# Patient Record
Sex: FEMALE | Race: WHITE | NOT HISPANIC OR LATINO | Employment: FULL TIME | URBAN - METROPOLITAN AREA
[De-identification: names, ages, dates, MRNs, and addresses within clinical notes are randomized per-mention and may not be internally consistent; named-entity substitution may affect disease eponyms.]

---

## 2017-12-21 PROBLEM — M54.2 PAIN RADIATING TO NECK: Status: ACTIVE | Noted: 2017-12-21

## 2018-01-30 PROBLEM — I10 HYPERTENSION: Status: ACTIVE | Noted: 2018-01-30

## 2018-01-30 PROBLEM — E78.5 HYPERLIPIDEMIA: Status: ACTIVE | Noted: 2018-01-30

## 2020-10-09 ENCOUNTER — CLINICAL SUPPORT (OUTPATIENT)
Dept: AUDIOLOGY | Facility: CLINIC | Age: 50
End: 2020-10-09
Payer: COMMERCIAL

## 2020-10-09 DIAGNOSIS — H93.A1 PULSATILE TINNITUS OF RIGHT EAR: ICD-10-CM

## 2020-10-09 PROCEDURE — 92557 PR COMPREHENSIVE HEARING TEST: ICD-10-PCS | Mod: S$GLB,,, | Performed by: AUDIOLOGIST

## 2020-10-09 PROCEDURE — 99999 PR PBB SHADOW E&M-EST. PATIENT-LVL I: ICD-10-PCS | Mod: PBBFAC,,,

## 2020-10-09 PROCEDURE — 92557 COMPREHENSIVE HEARING TEST: CPT | Mod: S$GLB,,, | Performed by: AUDIOLOGIST

## 2020-10-09 PROCEDURE — 99999 PR PBB SHADOW E&M-EST. PATIENT-LVL I: CPT | Mod: PBBFAC,,,

## 2020-10-09 PROCEDURE — 92567 PR TYMPA2METRY: ICD-10-PCS | Mod: S$GLB,,, | Performed by: AUDIOLOGIST

## 2020-10-09 PROCEDURE — 92567 TYMPANOMETRY: CPT | Mod: S$GLB,,, | Performed by: AUDIOLOGIST

## 2022-07-19 PROBLEM — M54.2 PAIN RADIATING TO NECK: Status: RESOLVED | Noted: 2017-12-21 | Resolved: 2022-07-19

## 2022-08-01 ENCOUNTER — TELEPHONE (OUTPATIENT)
Dept: PAIN MEDICINE | Facility: CLINIC | Age: 52
End: 2022-08-01
Payer: COMMERCIAL

## 2022-08-01 NOTE — TELEPHONE ENCOUNTER
Please have this patient come in for initial evaluation, she was referred by Dr. Lane Oliveira for possible bilateral C5/6 and C6/7 RFA.

## 2022-08-01 NOTE — TELEPHONE ENCOUNTER
Call placed to Pt to schedule appt for evaluation. She was referred by Dr. Lane Oliveira for possible bilateral C5/6 and C6/7 RFA. Appt scheuduled for 8-23-22. Pt verbalized understanding.

## 2022-09-20 ENCOUNTER — OFFICE VISIT (OUTPATIENT)
Dept: PAIN MEDICINE | Facility: CLINIC | Age: 52
End: 2022-09-20
Payer: COMMERCIAL

## 2022-09-20 VITALS
DIASTOLIC BLOOD PRESSURE: 75 MMHG | WEIGHT: 192 LBS | HEART RATE: 79 BPM | HEIGHT: 63 IN | OXYGEN SATURATION: 100 % | BODY MASS INDEX: 34.02 KG/M2 | SYSTOLIC BLOOD PRESSURE: 172 MMHG

## 2022-09-20 DIAGNOSIS — M47.812 CERVICAL SPONDYLOSIS: Primary | ICD-10-CM

## 2022-09-20 DIAGNOSIS — M50.30 DDD (DEGENERATIVE DISC DISEASE), CERVICAL: ICD-10-CM

## 2022-09-20 PROCEDURE — 3077F SYST BP >= 140 MM HG: CPT | Mod: CPTII,S$GLB,, | Performed by: ANESTHESIOLOGY

## 2022-09-20 PROCEDURE — 3078F PR MOST RECENT DIASTOLIC BLOOD PRESSURE < 80 MM HG: ICD-10-PCS | Mod: CPTII,S$GLB,, | Performed by: ANESTHESIOLOGY

## 2022-09-20 PROCEDURE — 3078F DIAST BP <80 MM HG: CPT | Mod: CPTII,S$GLB,, | Performed by: ANESTHESIOLOGY

## 2022-09-20 PROCEDURE — 4010F ACE/ARB THERAPY RXD/TAKEN: CPT | Mod: CPTII,S$GLB,, | Performed by: ANESTHESIOLOGY

## 2022-09-20 PROCEDURE — 4010F PR ACE/ARB THEARPY RXD/TAKEN: ICD-10-PCS | Mod: CPTII,S$GLB,, | Performed by: ANESTHESIOLOGY

## 2022-09-20 PROCEDURE — 1159F PR MEDICATION LIST DOCUMENTED IN MEDICAL RECORD: ICD-10-PCS | Mod: CPTII,S$GLB,, | Performed by: ANESTHESIOLOGY

## 2022-09-20 PROCEDURE — 3008F BODY MASS INDEX DOCD: CPT | Mod: CPTII,S$GLB,, | Performed by: ANESTHESIOLOGY

## 2022-09-20 PROCEDURE — 99204 OFFICE O/P NEW MOD 45 MIN: CPT | Mod: S$GLB,,, | Performed by: ANESTHESIOLOGY

## 2022-09-20 PROCEDURE — 3008F PR BODY MASS INDEX (BMI) DOCUMENTED: ICD-10-PCS | Mod: CPTII,S$GLB,, | Performed by: ANESTHESIOLOGY

## 2022-09-20 PROCEDURE — 99204 PR OFFICE/OUTPT VISIT, NEW, LEVL IV, 45-59 MIN: ICD-10-PCS | Mod: S$GLB,,, | Performed by: ANESTHESIOLOGY

## 2022-09-20 PROCEDURE — 3077F PR MOST RECENT SYSTOLIC BLOOD PRESSURE >= 140 MM HG: ICD-10-PCS | Mod: CPTII,S$GLB,, | Performed by: ANESTHESIOLOGY

## 2022-09-20 PROCEDURE — 99999 PR PBB SHADOW E&M-EST. PATIENT-LVL V: CPT | Mod: PBBFAC,,, | Performed by: ANESTHESIOLOGY

## 2022-09-20 PROCEDURE — 1159F MED LIST DOCD IN RCRD: CPT | Mod: CPTII,S$GLB,, | Performed by: ANESTHESIOLOGY

## 2022-09-20 PROCEDURE — 99999 PR PBB SHADOW E&M-EST. PATIENT-LVL V: ICD-10-PCS | Mod: PBBFAC,,, | Performed by: ANESTHESIOLOGY

## 2022-09-20 RX ORDER — SODIUM CHLORIDE, SODIUM LACTATE, POTASSIUM CHLORIDE, CALCIUM CHLORIDE 600; 310; 30; 20 MG/100ML; MG/100ML; MG/100ML; MG/100ML
INJECTION, SOLUTION INTRAVENOUS CONTINUOUS
Status: CANCELLED | OUTPATIENT
Start: 2022-09-20

## 2022-09-20 NOTE — H&P (VIEW-ONLY)
This note was completed with dictation software and grammatical errors may exist.    Chief Complaint   Patient presents with    Neck Pain        HPI: Lupe Hein is a 52 y.o. year old female patient who has a past medical history of Abdominal bruit, Abnormal EKG, Anxiety, Coronary atherosclerosis, Environmental and seasonal allergies, Family history of cardiovascular disease, Fibroids, GERD (gastroesophageal reflux disease), Heart murmur, History of chicken pox, Hyperlipidemia, Hypertension, Migraine, Overactive bladder, and PVC (premature ventricular contraction). She presents in referral from Dr. Tee Carrington for neck pain.  And states that she has had pain for years, probably greater than 3 years.  Is located the midline neck, does not radiate out into the arms but does report some numbness and tingling in her left thumb and 5th finger at times.  Otherwise she denies any radiation down the arms.  She does have some numbness and tingling in both hands when she sleeps at night.  Otherwise her pain is worse when she 1st wakes up in the morning, feels very stiff, improved slightly throughout the day but then worsens if she is been sitting for a long time.  She does feel that her balance has been off at times but denies any gentry weakness.  She would gone through physical therapy in the past without much relief.  She has tried NSAIDs but has developed an allergy to them.  She has taken hydrocodone at times.  She had an MRI performed in May, 2022 that shows significant degenerative changes at C5/6 and C6/7.  She has a history of neck pain, had seen Dr. Lane Oliveira, spine surgeon who recommended bilateral C5/6 and C6/7 radiofrequency ablation.    She also reports pain all over body, she feels that she has pain in her back, all of her joints when she wakes up in the morning.      Pain intervention history:  No injection    Spine surgeries:  None    Antineuropathics:  NSAIDs:  Physical  "therapy:  Antidepressants:  Zoloft 25  Muscle relaxers:  Opioids:  Hydrocodone 10/325  Antiplatelets/Anticoagulants:  None        ROS:  She reports neck pain, joint stiffness, joint pain.  Balance of review of systems is negative.    No results found for: LABA1C, HGBA1C    Lab Results   Component Value Date    WBC 6.11 2021    HGB 12.5 2021    HCT 39.0 2021    MCV 92 2021     2021             Past Medical History:   Diagnosis Date    Abdominal bruit     Abnormal EKG     Anxiety     Coronary atherosclerosis 2016    Environmental and seasonal allergies     Family history of cardiovascular disease     Fibroids     uterine    GERD (gastroesophageal reflux disease)     Heart murmur     History of chicken pox     Hyperlipidemia     Hypertension     Migraine     Overactive bladder     PVC (premature ventricular contraction)        Past Surgical History:   Procedure Laterality Date    AUGMENTATION OF BREAST  2008    BREAST BIOPSY Left 2015    benign needle biopsy of lymph node. pt still feels this node 2020    BREAST SURGERY  2009     SECTION      X 2    HYSTERECTOMY      LASIK      LYMPH NODE BIOPSY      Dr. Byrne     RHINOPLASTY TIP         Social History     Socioeconomic History    Marital status:    Tobacco Use    Smoking status: Never    Smokeless tobacco: Never   Substance and Sexual Activity    Alcohol use: Yes     Comment: occasionally, increased since COVID    Drug use: Never    Sexual activity: Yes     Partners: Male         Medications/Allergies: See med card    Vitals:    22 0853   BP: (!) 172/75   Pulse: 79   SpO2: 100%   Weight: 87.1 kg (192 lb 0.3 oz)   Height: 5' 3" (1.6 m)   PainSc:   6   PainLoc: Neck     Body mass index is 34.01 kg/m².    Physical exam:  Gen: A and O x3, pleasant, well-groomed  Skin: No rashes or obvious lesions  HEENT: PERRLA, no obvious deformities on ears or in canals.Trachea midline.  CVS: Regular rate and rhythm, " normal palpable pulses.  Resp: Clear to auscultation bilaterally, no wheezes or rales.  Abdomen: Soft, NT/ND.  Musculoskeletal: No antalgic gait.   Coordination   Romberg:  Slight imbalance  Tandem walking coordination: normal    Neuro:  Motor:    Right Left   C4 Shoulder Abduction  5  5   C5 Elbow Flexion    5  5   C6 Wrist Extension  5  5   C7 Elbow Extension   5  5   C8/T1 Hand Intrinsics   5  5   C8 First Dorsal Interosseus  5  5   C8 Abductor Pollicus Brevis  5  5      Left  Right    Triceps DTR 2+ 2+   Biceps DTR 2+ 2+   Brachioradialis DTR 2+ 2+   Patellar DTR 2+ 2+   Achilles DTR 2+ 2+   Ghotra Absent  Absent               Sensory: Intact and symmetrical to light touch and pinprick in C2-T1 dermatomes bilaterally.  Cervical spine: ROM is full in flexion, extension and lateral rotation with increased pain on flexion and also extension and especially oblique extension to the left greater than right side causing pain   Spurling's maneuver causes no neck pain to either side.  Myofascial exam: No Tenderness to palpation across cervical paraspinous region bilaterally.    Imagin22 MRI C-spine:  The cervical vertebral body heights appear to be preserved.  There appears to be minimal 1 mm retrolisthesis of C5 on C6 and C6 on C7.  There is reversal the normal cervical lordosis which could be related to muscular spasm and/or positioning.  Modic 1 endplate degenerative signal changes are seen at the opposing endplates of C6-C7 with likely reactive edema extending into the left greater than right pedicles.  Similar milder findings are noted on the previous MRI from 2019. There is patchy increased T2 signal within the inferior posterior right mastoid air cells compatible with small right mastoid effusion.   The cervical cord demonstrates no definite abnormal T2 signal suggestive of definite myelomalacia or cord edema.   C2-C3 demonstrates no significant posterior disc protrusion, central spinal canal stenosis, or  neural foraminal stenosis.   C3-C4 demonstrates mild broad-based posterior disc osteophyte complex and small central annular fissure.  There is effacement of the ventral thecal sac with mild overall central spinal canal stenosis.  The AP diameter of the central thecal sac measures approximately 8 mm.  No significant neural foraminal stenosis is seen..   C4-C5 demonstrates mild broad-based posterior disc osteophyte complex with effacement of the ventral thecal sac and mild overall central spinal canal stenosis.  Moderate left uncovertebral joint hypertrophy is seen.  Moderate left neural foraminal narrowing is similar to the previous study.   C5-C6 demonstrates moderate to severe disc space narrowing, mild broad-based posterior disc osteophyte complex, moderate bilateral uncovertebral joint hypertrophy, and mild bilateral facet arthrosis with mild overall central spinal canal stenosis.  Moderate bilateral neural foraminal narrowing is again seen.   C6-C7 demonstrates moderate disc space narrowing, moderate to severe broad-based posterior disc osteophyte complex, mild-to-moderate bilateral uncovertebral joint hypertrophy.  There is effacement of the ventral thecal sac with mild to moderate overall central spinal canal stenosis with the AP diameter of the central thecal sac measuring approximately 7-8 mm.  Mild-to-moderate right and mild left neural foraminal narrowing is noted..   C7-T1 demonstrates no significant posterior disc protrusion, central spinal canal stenosis, or neural foraminal stenosis.    Assessment:  Lupe Hein is a 52 y.o. year old female patient who has a past medical history of Abdominal bruit, Abnormal EKG, Anxiety, Coronary atherosclerosis, Environmental and seasonal allergies, Family history of cardiovascular disease, Fibroids, GERD (gastroesophageal reflux disease), Heart murmur, History of chicken pox, Hyperlipidemia, Hypertension, Migraine, Overactive bladder, and PVC (premature  ventricular contraction). She presents in referral from Dr. Tee Carrington for neck pain.    1. Cervical spondylosis        2. DDD (degenerative disc disease), cervical            Plan:  1. We discussed her symptoms, physical exam and cervical MRI.  She has significant degenerative change at C5/6 and C6/7 but also other levels.  We discussed that she could have facet arthropathy causing pain and most likely pain generators are C5/6 and C6/7 so we discussed bilateral medial branch blocks, we will call her the following day to see if she is had relief.  If she does not have relief with this, we could consider trying at C4/5.  Otherwise she could have discogenic pain.  If she does have relief with 2 separate medial branch blocks we will proceed with radiofrequency ablation.      Thank you for referring this interesting patient, and I look forward to continuing to collaborate in her care.

## 2022-10-03 ENCOUNTER — HOSPITAL ENCOUNTER (OUTPATIENT)
Dept: RADIOLOGY | Facility: HOSPITAL | Age: 52
Discharge: HOME | End: 2022-10-03
Attending: ANESTHESIOLOGY | Admitting: ANESTHESIOLOGY
Payer: COMMERCIAL

## 2022-10-03 ENCOUNTER — HOSPITAL ENCOUNTER (OUTPATIENT)
Facility: HOSPITAL | Age: 52
Discharge: HOME | End: 2022-10-03
Attending: ANESTHESIOLOGY | Admitting: ANESTHESIOLOGY
Payer: COMMERCIAL

## 2022-10-03 VITALS
WEIGHT: 190 LBS | BODY MASS INDEX: 33.66 KG/M2 | OXYGEN SATURATION: 99 % | RESPIRATION RATE: 15 BRPM | SYSTOLIC BLOOD PRESSURE: 142 MMHG | HEART RATE: 72 BPM | TEMPERATURE: 97 F | HEIGHT: 63 IN | DIASTOLIC BLOOD PRESSURE: 68 MMHG

## 2022-10-03 DIAGNOSIS — M54.2 NECK PAIN: ICD-10-CM

## 2022-10-03 DIAGNOSIS — M47.812 CERVICAL SPONDYLOSIS: ICD-10-CM

## 2022-10-03 DIAGNOSIS — M50.30 DDD (DEGENERATIVE DISC DISEASE), CERVICAL: ICD-10-CM

## 2022-10-03 PROCEDURE — 64491 PR INJ DX/THER AGNT PARAVERT FACET JOINT,IMG GUIDE,CERV/THORAC, 2ND LEVEL: ICD-10-PCS | Mod: 50,KX,, | Performed by: ANESTHESIOLOGY

## 2022-10-03 PROCEDURE — 64491 INJ PARAVERT F JNT C/T 2 LEV: CPT | Mod: 50,KX,, | Performed by: ANESTHESIOLOGY

## 2022-10-03 PROCEDURE — 25000003 PHARM REV CODE 250: Mod: PO | Performed by: ANESTHESIOLOGY

## 2022-10-03 PROCEDURE — 63600175 PHARM REV CODE 636 W HCPCS: Mod: PO | Performed by: ANESTHESIOLOGY

## 2022-10-03 PROCEDURE — 64490 INJ PARAVERT F JNT C/T 1 LEV: CPT | Mod: 50,KX,, | Performed by: ANESTHESIOLOGY

## 2022-10-03 PROCEDURE — 64490 PR INJ DX/THER AGNT PARAVERT FACET JOINT,IMG GUIDE,CERV/THORAC, 1ST LEVEL: ICD-10-PCS | Mod: 50,KX,, | Performed by: ANESTHESIOLOGY

## 2022-10-03 PROCEDURE — 64490 INJ PARAVERT F JNT C/T 1 LEV: CPT | Mod: 50,PO | Performed by: ANESTHESIOLOGY

## 2022-10-03 PROCEDURE — 99152 MOD SED SAME PHYS/QHP 5/>YRS: CPT | Mod: ,,, | Performed by: ANESTHESIOLOGY

## 2022-10-03 PROCEDURE — 99152 PR MOD CONSCIOUS SEDATION, SAME PHYS, 5+ YRS, FIRST 15 MIN: ICD-10-PCS | Mod: ,,, | Performed by: ANESTHESIOLOGY

## 2022-10-03 PROCEDURE — 76000 FLUOROSCOPY <1 HR PHYS/QHP: CPT | Mod: TC,PO

## 2022-10-03 PROCEDURE — 64491 INJ PARAVERT F JNT C/T 2 LEV: CPT | Mod: 50,PO | Performed by: ANESTHESIOLOGY

## 2022-10-03 RX ORDER — LIDOCAINE HYDROCHLORIDE 10 MG/ML
INJECTION, SOLUTION EPIDURAL; INFILTRATION; INTRACAUDAL; PERINEURAL
Status: DISCONTINUED | OUTPATIENT
Start: 2022-10-03 | End: 2022-10-03 | Stop reason: HOSPADM

## 2022-10-03 RX ORDER — BUPIVACAINE HYDROCHLORIDE 2.5 MG/ML
INJECTION, SOLUTION EPIDURAL; INFILTRATION; INTRACAUDAL
Status: DISCONTINUED | OUTPATIENT
Start: 2022-10-03 | End: 2022-10-03 | Stop reason: HOSPADM

## 2022-10-03 RX ORDER — MIDAZOLAM HYDROCHLORIDE 2 MG/2ML
INJECTION, SOLUTION INTRAMUSCULAR; INTRAVENOUS
Status: DISCONTINUED | OUTPATIENT
Start: 2022-10-03 | End: 2022-10-03 | Stop reason: HOSPADM

## 2022-10-03 RX ORDER — SODIUM CHLORIDE, SODIUM LACTATE, POTASSIUM CHLORIDE, CALCIUM CHLORIDE 600; 310; 30; 20 MG/100ML; MG/100ML; MG/100ML; MG/100ML
INJECTION, SOLUTION INTRAVENOUS CONTINUOUS
Status: DISCONTINUED | OUTPATIENT
Start: 2022-10-03 | End: 2022-10-03 | Stop reason: HOSPADM

## 2022-10-03 RX ADMIN — SODIUM CHLORIDE, SODIUM LACTATE, POTASSIUM CHLORIDE, AND CALCIUM CHLORIDE: .6; .31; .03; .02 INJECTION, SOLUTION INTRAVENOUS at 01:10

## 2022-10-03 NOTE — DISCHARGE INSTRUCTIONS
PAIN MANAGEMENT    HOME CARE INSTRUCTIONS   Do not use heat (such as a heating pad) for 24 hours.  You may apply an ice pack to the injection site for 20 minutes at a time for the first 24 hours for soreness/discomfort at injection site   Keep site clean and dry for 24 hours. If bandaid is present, remove when desired.   Do not drive until tomorrow.  Take care when walking after a lumbar injection.   Resume home medication as prescribed today.  Resume Aspirin, Plavix, or Coumadin the day after the procedure unless other wise instructed.    BLOCKS  Resume regular activities today.  Pain office will call in next 2 days.      CALL PHYSICIAN FOR:  Severe increase in your usual pain or the appearance of new pain.  Prolonged or increasing weakness or numbness in the legs or arms.  Fever greater than 100 degrees F.  Drainage, redness, active bleeding, or increased swelling at the injection site.  Headache that increases when your head is upright and decreases when you lie flat.    FOR EMERGENCIES:   Go directly to the emergency department for any shortness of breath, chest pain, or problems breathing.

## 2022-10-03 NOTE — OP NOTE
PROCEDURE DATE: 10/3/2022    PROCEDURE:  Diagnostic Cervical medial branch block of the bilateral C5,6,7 medial branch nerves on the bilateral-side utilizing fluoroscopy    DIAGNOSIS:  Cervical spondylosis    PHYSICIAN: Julius Doshi MD    MEDICATIONS INJECTED:  0.25% bupivicaine, 0.5ml at each level.    LOCAL ANESTHETIC USED:   1% lidocaine, 1ml at each level.    SEDATION MEDICATIONS: 4mg versed    ESTIMATED BLOOD LOSS:  none    COMPLICATIONS:  none    TECHNIQUE : A time-out was taken to identify patient and procedure side prior to starting the procedure.  The patient was positioned prone with the site of interest side up. The patient was prepped and draped in the usual sterile fashion using ChloraPrep and sterile towels.  The level was determined under fluoroscopic guidance using a slightly posteriorly oblique view.   Local anesthetic was infiltrated superficially at the skin level.  Then, a 25 gauge 3.5 inch needle was inserted to the anatomic location of the midsection of the lateral masses of the right and then left C5,6,7. A cross table view was then taken to ensure that needles did not cross into neural foramina.  contrast dye was then injected to assess appropriate spread and that there was no vascular uptake. The above noted medication was then injected. The patient tolerated the procedure well.     The patient was monitored after the procedure. The patient will be contacted tomorrow to determine the extent of relief. The patient was given post procedure and discharge instructions to follow at home. The patient was discharged in a stable condition    Event Time In   In Facility 1256   In Pre-Procedure 1319   Physician Available    Anesthesia Available    Pre-Op: Bedside Procedure Start    Pre-Op: Bedside Procedure Stop    Pre-Procedure Complete 1341   Out of Pre-Procedure    Anesthesia Start    Anesthesia Start Data Collection    Setup Start    Setup Complete    In Room 1423   Prep Start    Procedure  Prep Complete    Procedure Start 1431   Procedure Closing    Emergence    Procedure Finish 1443   Sedation Start 1422   Scope In    Extent Reached    Scope Out    Sedation End 1443   Out of Room 1446   Cleanup Start    Cleanup Complete    Cosmetic Start    Cosmetic Stop    Pain Mgmt In Room    Pain Mgmt Out Room    In Recovery    Anesthesia Finish    Bedside Procedure Start    Bedside Procedure Stop    Recovery Care Complete    Out of Recovery    To Phase II    In Phase II    Pain Mgmt Recovery Start    Pain Mgmt Recovery Stop    Obs Rec Start    Obs Rec Stop    Phase II Care Complete    Out of Phase II    Procedural Care Complete    Discharge    Pain Follow Up Needed    Pain Follow Up Complete      Moderate sedation was achieved with midazolam 4 mg .  Continuous monitoring of EKG, blood pressure and pulse oximetry was provided by a registered nurse during the entire course of the procedure under my supervision and recorded in the patient's medical record.   Total time for sedation was 21 minutes.

## 2022-10-03 NOTE — DISCHARGE SUMMARY
[facility]  Discharge Note  Short Stay    Procedure(s) (LRB):  Block-nerve-medial branch-cervical C5/6, C6/7 (Bilateral)      OUTCOME: Patient tolerated treatment/procedure well without complication and is now ready for discharge.    DISPOSITION: Home or Self Care    FINAL DIAGNOSIS:  Cervical spondylosis    FOLLOWUP: In clinic    DISCHARGE INSTRUCTIONS:    Discharge Procedure Orders   Diet Adult Regular     No dressing needed     Notify your health care provider if you experience any of the following:  temperature >100.4     Activity as tolerated

## 2022-10-04 ENCOUNTER — TELEPHONE (OUTPATIENT)
Dept: PAIN MEDICINE | Facility: CLINIC | Age: 52
End: 2022-10-04
Payer: COMMERCIAL

## 2022-10-04 DIAGNOSIS — M47.812 CERVICAL SPONDYLOSIS: Primary | ICD-10-CM

## 2022-10-04 RX ORDER — SODIUM CHLORIDE, SODIUM LACTATE, POTASSIUM CHLORIDE, CALCIUM CHLORIDE 600; 310; 30; 20 MG/100ML; MG/100ML; MG/100ML; MG/100ML
INJECTION, SOLUTION INTRAVENOUS CONTINUOUS
Status: CANCELLED | OUTPATIENT
Start: 2022-10-13

## 2022-10-04 NOTE — TELEPHONE ENCOUNTER
We gave her 4 mg of Versed which is quite a lot.  Please let her know that she is going to hear the conversations that we have, this does not put her to sleep.  She is going to feel sensations because she is not completely under anesthesia.  We will try to keep her more comfortable, this involves putting in more local anesthetic but for safety reasons we do not put people completely under anesthesia.

## 2022-10-04 NOTE — TELEPHONE ENCOUNTER
Call placed to Pt to review results from Cervical MBB, C5/6, C6/7 performed by Dr. Doshi to ask:    1. What percentage of pain relief did you receive following the block, from 0-100%? What was pain score from 0-10?    2. How many hours did pain relief last following the block?      3. During this time please describe in detail the activities you were able to do?      1- 90-95% Pain score 1.  2- 8hrs  3- walking, fixing dinner    2nd block scheduled for 10-13-22. Pt would like to possibly be the first one of the day.     Pt states that she would like to increase the amount of sedation she received. She was able to feel most of it and could hear the conversations during the procedure. Request forwarded to Dr. Doshi for review.

## 2022-10-05 NOTE — TELEPHONE ENCOUNTER
Call placed to Pt to advise per Dr. Doshi that the Versed does not put her to sleep.      She is going to feel sensations because she is not completely under anesthesia.      We will try to keep her more comfortable, this involves putting in more local anesthetic but for safety reasons we do not put people completely under anesthesia.    No answer. V/m left.

## 2022-10-06 ENCOUNTER — TELEPHONE (OUTPATIENT)
Dept: GASTROENTEROLOGY | Facility: CLINIC | Age: 52
End: 2022-10-06
Payer: COMMERCIAL

## 2022-10-06 ENCOUNTER — TELEPHONE (OUTPATIENT)
Dept: PAIN MEDICINE | Facility: CLINIC | Age: 52
End: 2022-10-06
Payer: COMMERCIAL

## 2022-10-06 RX ORDER — TRAMADOL HYDROCHLORIDE 50 MG/1
50 TABLET ORAL EVERY 8 HOURS PRN
Qty: 20 TABLET | Refills: 0 | Status: SHIPPED | OUTPATIENT
Start: 2022-10-06 | End: 2022-11-23

## 2022-10-06 NOTE — TELEPHONE ENCOUNTER
----- Message from Nayeli Marie sent at 10/5/2022  1:58 PM CDT -----  Regarding: Questions and concerns  Contact: [phone]  Patient Lupe is calling. Patient had a procedure done on 10/3 and patient is having more pain than usual. Cervical nerve block. Please call patient at [phone]     Thank You

## 2022-10-06 NOTE — TELEPHONE ENCOUNTER
Returned call to the patient, patient requested to set up colonoscopy, colonoscopy scheduled with the patient, patient verbalized understanding of this.

## 2022-10-06 NOTE — TELEPHONE ENCOUNTER
Cervical nerve block done on 10/3 and patient is having more pain in her neck on the left side. Pain score is a 8. Request for pain meds to help until next procedure on  10/13/22. Message forwarded to Dr. Doshi for review.

## 2022-10-06 NOTE — TELEPHONE ENCOUNTER
----- Message from Christy Padilla sent at 10/6/2022  3:08 PM CDT -----  Contact: self  Type: Needs Medical Advice    Who Called:  patient  Symptoms (please be specific):  c-scope consultation     Best Call Back Number: [phone]   Additional Information: The pt stated that she would like to jemima a c-scope consultation before she jemima a c-scope. The pt would like to jemima the procs with Dr. Saldana.  Please call pt back to Count includes the [facility] an appt. Thanks.

## 2022-10-13 ENCOUNTER — TELEPHONE (OUTPATIENT)
Dept: GASTROENTEROLOGY | Facility: CLINIC | Age: 52
End: 2022-10-13
Payer: COMMERCIAL

## 2022-10-13 ENCOUNTER — HOSPITAL ENCOUNTER (OUTPATIENT)
Dept: RADIOLOGY | Facility: HOSPITAL | Age: 52
Discharge: HOME | End: 2022-10-13
Attending: ANESTHESIOLOGY | Admitting: ANESTHESIOLOGY
Payer: COMMERCIAL

## 2022-10-13 ENCOUNTER — HOSPITAL ENCOUNTER (OUTPATIENT)
Facility: HOSPITAL | Age: 52
Discharge: HOME | End: 2022-10-13
Attending: ANESTHESIOLOGY | Admitting: ANESTHESIOLOGY
Payer: COMMERCIAL

## 2022-10-13 ENCOUNTER — PATIENT MESSAGE (OUTPATIENT)
Dept: GASTROENTEROLOGY | Facility: CLINIC | Age: 52
End: 2022-10-13
Payer: COMMERCIAL

## 2022-10-13 VITALS
DIASTOLIC BLOOD PRESSURE: 74 MMHG | BODY MASS INDEX: 33.84 KG/M2 | SYSTOLIC BLOOD PRESSURE: 162 MMHG | HEART RATE: 72 BPM | WEIGHT: 191 LBS | OXYGEN SATURATION: 98 % | RESPIRATION RATE: 16 BRPM | TEMPERATURE: 98 F | HEIGHT: 63 IN

## 2022-10-13 DIAGNOSIS — M54.2 NECK PAIN: ICD-10-CM

## 2022-10-13 DIAGNOSIS — M47.812 CERVICAL SPONDYLOSIS: ICD-10-CM

## 2022-10-13 PROCEDURE — 64490 INJ PARAVERT F JNT C/T 1 LEV: CPT | Mod: 50,PO | Performed by: ANESTHESIOLOGY

## 2022-10-13 PROCEDURE — 64491 PR INJ DX/THER AGNT PARAVERT FACET JOINT,IMG GUIDE,CERV/THORAC, 2ND LEVEL: ICD-10-PCS | Mod: 50,KX,, | Performed by: ANESTHESIOLOGY

## 2022-10-13 PROCEDURE — 64490 PR INJ DX/THER AGNT PARAVERT FACET JOINT,IMG GUIDE,CERV/THORAC, 1ST LEVEL: ICD-10-PCS | Mod: 50,KX,, | Performed by: ANESTHESIOLOGY

## 2022-10-13 PROCEDURE — 63600175 PHARM REV CODE 636 W HCPCS: Mod: PO | Performed by: ANESTHESIOLOGY

## 2022-10-13 PROCEDURE — 64491 INJ PARAVERT F JNT C/T 2 LEV: CPT | Mod: 50,KX,, | Performed by: ANESTHESIOLOGY

## 2022-10-13 PROCEDURE — 64490 INJ PARAVERT F JNT C/T 1 LEV: CPT | Mod: 50,KX,, | Performed by: ANESTHESIOLOGY

## 2022-10-13 PROCEDURE — 64491 INJ PARAVERT F JNT C/T 2 LEV: CPT | Mod: 50,PO | Performed by: ANESTHESIOLOGY

## 2022-10-13 PROCEDURE — 99152 PR MOD CONSCIOUS SEDATION, SAME PHYS, 5+ YRS, FIRST 15 MIN: ICD-10-PCS | Mod: ,,, | Performed by: ANESTHESIOLOGY

## 2022-10-13 PROCEDURE — 99152 MOD SED SAME PHYS/QHP 5/>YRS: CPT | Mod: ,,, | Performed by: ANESTHESIOLOGY

## 2022-10-13 PROCEDURE — 25000003 PHARM REV CODE 250: Mod: PO | Performed by: ANESTHESIOLOGY

## 2022-10-13 PROCEDURE — 76000 FLUOROSCOPY <1 HR PHYS/QHP: CPT | Mod: TC,PO

## 2022-10-13 RX ORDER — BUPIVACAINE HYDROCHLORIDE 2.5 MG/ML
INJECTION, SOLUTION EPIDURAL; INFILTRATION; INTRACAUDAL
Status: DISCONTINUED | OUTPATIENT
Start: 2022-10-13 | End: 2022-10-13 | Stop reason: HOSPADM

## 2022-10-13 RX ORDER — SODIUM CHLORIDE, SODIUM LACTATE, POTASSIUM CHLORIDE, CALCIUM CHLORIDE 600; 310; 30; 20 MG/100ML; MG/100ML; MG/100ML; MG/100ML
INJECTION, SOLUTION INTRAVENOUS CONTINUOUS
Status: DISCONTINUED | OUTPATIENT
Start: 2022-10-13 | End: 2022-10-13 | Stop reason: HOSPADM

## 2022-10-13 RX ORDER — FENTANYL CITRATE 50 UG/ML
INJECTION, SOLUTION INTRAMUSCULAR; INTRAVENOUS
Status: DISCONTINUED | OUTPATIENT
Start: 2022-10-13 | End: 2022-10-13 | Stop reason: HOSPADM

## 2022-10-13 RX ORDER — MIDAZOLAM HYDROCHLORIDE 2 MG/2ML
INJECTION, SOLUTION INTRAMUSCULAR; INTRAVENOUS
Status: DISCONTINUED | OUTPATIENT
Start: 2022-10-13 | End: 2022-10-13 | Stop reason: HOSPADM

## 2022-10-13 RX ADMIN — SODIUM CHLORIDE, SODIUM LACTATE, POTASSIUM CHLORIDE, AND CALCIUM CHLORIDE: .6; .31; .03; .02 INJECTION, SOLUTION INTRAVENOUS at 04:10

## 2022-10-13 NOTE — OP NOTE
PROCEDURE DATE: 10/13/2022    PROCEDURE:  Diagnostic Cervical medial branch block of the C5,6,7 medial branch nerves on the bilateral-side utilizing fluoroscopy    DIAGNOSIS:  Cervical spondylosis    PHYSICIAN: Julius Doshi MD    MEDICATIONS INJECTED:  0.25% bupivicaine, 0.5ml at each level.    LOCAL ANESTHETIC USED:   1% lidocaine, 1ml at each level.    SEDATION MEDICATIONS: 4mg versed, 25mcg fentanyl    ESTIMATED BLOOD LOSS:  none    COMPLICATIONS:  none    TECHNIQUE : A time-out was taken to identify patient and procedure side prior to starting the procedure.  The patient was positioned prone with the site of interest side up. The patient was prepped and draped in the usual sterile fashion using ChloraPrep and sterile towels.  The level was determined under fluoroscopic guidance using a slightly posteriorly oblique view.   Local anesthetic was infiltrated superficially at the skin level.  Then, a 25 gauge 3.5 inch needle was inserted to the anatomic location of the midsection of the lateral masses of C5,6,7 bilaterally. A cross table view was then taken to ensure that needles did not cross into neural foramina.  The above noted medication was then injected. The patient tolerated the procedure well.     The patient was monitored after the procedure. The patient will be contacted tomorrow to determine the extent of relief. The patient was given post procedure and discharge instructions to follow at home. The patient was discharged in a stable condition.    Event Time In   In Facility 1507   In Pre-Procedure 1523   Physician Available    Anesthesia Available    Pre-Op: Bedside Procedure Start    Pre-Op: Bedside Procedure Stop    Pre-Procedure Complete 1614   Out of Pre-Procedure    Anesthesia Start    Anesthesia Start Data Collection    Setup Start    Setup Complete    In Room 1635   Prep Start    Procedure Prep Complete    Procedure Start 1643   Procedure Closing    Emergence    Procedure Finish 1653   Sedation  Start 1634   Scope In    Extent Reached    Scope Out    Sedation End 1653   Out of Room 1655   Cleanup Start    Cleanup Complete    Cosmetic Start    Cosmetic Stop    Pain Mgmt In Room    Pain Mgmt Out Room    In Recovery    Anesthesia Finish    Bedside Procedure Start    Bedside Procedure Stop    Recovery Care Complete    Out of Recovery    To Phase II    In Phase II    Pain Mgmt Recovery Start 1656   Pain Mgmt Recovery Stop    Obs Rec Start    Obs Rec Stop    Phase II Care Complete    Out of Phase II    Procedural Care Complete    Discharge    Pain Follow Up Needed    Pain Follow Up Complete      Moderate sedation was achieved with midazolam 4 mg and fentanyl 25 mcg.  Continuous monitoring of EKG, blood pressure and pulse oximetry was provided by a registered nurse during the entire course of the procedure under my supervision and recorded in the patient's medical record.   Total time for sedation was 19 minutes.

## 2022-10-13 NOTE — TELEPHONE ENCOUNTER
----- Message from Magda Dong sent at 10/13/2022 11:32 AM CDT -----  Contact: pt  Type: Needs Colonoscopy cancelled    Who Called: pt   Best Call Back Number: [phone]    Inquiry/Question: pt would like to cancel the colonoscopy scheduled for 01/24/2022         Thank you~

## 2022-10-13 NOTE — DISCHARGE SUMMARY
[facility]  Discharge Note  Short Stay    Procedure(s) (LRB):  Block-nerve-medial branch-cervical, C5/6, C6/7 (Bilateral)      OUTCOME: Patient tolerated treatment/procedure well without complication and is now ready for discharge.    DISPOSITION: Home or Self Care    FINAL DIAGNOSIS:  Cervical spondylosis    FOLLOWUP: In clinic    DISCHARGE INSTRUCTIONS:    Discharge Procedure Orders   Diet Adult Regular     No dressing needed     Notify your health care provider if you experience any of the following:  temperature >100.4     Activity as tolerated

## 2022-10-17 ENCOUNTER — PATIENT MESSAGE (OUTPATIENT)
Dept: GASTROENTEROLOGY | Facility: CLINIC | Age: 52
End: 2022-10-17
Payer: COMMERCIAL

## 2022-10-18 ENCOUNTER — TELEPHONE (OUTPATIENT)
Dept: PAIN MEDICINE | Facility: CLINIC | Age: 52
End: 2022-10-18
Payer: COMMERCIAL

## 2022-10-18 NOTE — TELEPHONE ENCOUNTER
Call placed to Pt to review the results of Cervical MBB, C5/6, C6/7, ZAHIDA performed by Dr. Doshi to ask:    1. What percentage of pain relief did you receive following the block, from 0-100%? What was pain score from 0-10?    2. How many hours did pain relief last following the block?      3. During this time please describe in detail the activities you were able to do?    No answer. V/M left.

## 2022-10-21 ENCOUNTER — TELEPHONE (OUTPATIENT)
Dept: PAIN MEDICINE | Facility: CLINIC | Age: 52
End: 2022-10-21
Payer: COMMERCIAL

## 2022-10-21 DIAGNOSIS — M47.812 CERVICAL SPONDYLOSIS: Primary | ICD-10-CM

## 2022-10-21 RX ORDER — SODIUM CHLORIDE, SODIUM LACTATE, POTASSIUM CHLORIDE, CALCIUM CHLORIDE 600; 310; 30; 20 MG/100ML; MG/100ML; MG/100ML; MG/100ML
INJECTION, SOLUTION INTRAVENOUS CONTINUOUS
Status: CANCELLED | OUTPATIENT
Start: 2022-11-04

## 2022-10-21 NOTE — TELEPHONE ENCOUNTER
Cervical RFA, C5/6, C6/7, ZAHIDA  scheduled for 11-4-22. F/U scheduled for 12-5-22. Pt will need to hold ASA x 7 days for the procedure. Clearance request sent to Dr. Little.

## 2022-10-21 NOTE — TELEPHONE ENCOUNTER
Second call placed to Pt to review the results of Cervical MBB, C5/6, C6/7, ZAHIDA performed by Dr. Doshi to ask:     1. What percentage of pain relief did you receive following the block, from 0-100%? What was pain score from 0-10?     2. How many hours did pain relief last following the block?       3. During this time please describe in detail the activities you were able to do?     1- % Pain score 2.   2- 6hrs  3- House work, bending.     RFA scheduled for 11-4-22. F/U scheduled for 12-5-22.

## 2022-10-25 ENCOUNTER — PATIENT MESSAGE (OUTPATIENT)
Dept: GASTROENTEROLOGY | Facility: CLINIC | Age: 52
End: 2022-10-25
Payer: COMMERCIAL

## 2022-10-25 NOTE — TELEPHONE ENCOUNTER
Call placed to Pt to advise per Dr. Little, it is ok to hold ASA x 7 days for the procedure on 11-4-22 with Dr. Doshi. No answer. V/m left.

## 2022-11-03 ENCOUNTER — TELEPHONE (OUTPATIENT)
Dept: PAIN MEDICINE | Facility: CLINIC | Age: 52
End: 2022-11-03
Payer: COMMERCIAL

## 2022-11-03 ENCOUNTER — TELEPHONE (OUTPATIENT)
Dept: SURGERY | Facility: HOSPITAL | Age: 52
End: 2022-11-03
Payer: COMMERCIAL

## 2022-11-03 NOTE — TELEPHONE ENCOUNTER
Call returned to Pt to reschedule RFA with Dr. Doshi. Procedure moved to 11-18-22. Pt verbalized understanding.

## 2022-11-03 NOTE — TELEPHONE ENCOUNTER
----- Message from Tejas Swartz sent at 11/3/2022  9:06 AM CDT -----  Name Of Caller:Lupe            Provider Name:Julius Doshi            Does patient feel the need to be seen today?No            Relationship to the Pt?:Patient            Contact Preference?:[phone]            What is the nature of the call?: Patient would like to reschedule an procedure tomorrow. Patient would like to receive a phone call to reschedule

## 2022-11-03 NOTE — TELEPHONE ENCOUNTER
Good morning,     Ms. Hein would like to reschedule her cervical KYA with Dr. Doshi - tomorrow no longer works for her due to work conflicts.     Please call her to reschedule.     Thank you!

## 2022-11-16 RX ORDER — PANTOPRAZOLE SODIUM 40 MG/1
40 TABLET, DELAYED RELEASE ORAL DAILY
COMMUNITY

## 2022-11-18 ENCOUNTER — HOSPITAL ENCOUNTER (OUTPATIENT)
Facility: HOSPITAL | Age: 52
Discharge: HOME | End: 2022-11-18
Attending: ANESTHESIOLOGY | Admitting: ANESTHESIOLOGY
Payer: COMMERCIAL

## 2022-11-18 ENCOUNTER — PATIENT MESSAGE (OUTPATIENT)
Dept: SURGERY | Facility: HOSPITAL | Age: 52
End: 2022-11-18
Payer: COMMERCIAL

## 2022-11-18 ENCOUNTER — HOSPITAL ENCOUNTER (OUTPATIENT)
Dept: RADIOLOGY | Facility: HOSPITAL | Age: 52
Discharge: HOME | End: 2022-11-18
Attending: ANESTHESIOLOGY | Admitting: ANESTHESIOLOGY
Payer: COMMERCIAL

## 2022-11-18 VITALS
SYSTOLIC BLOOD PRESSURE: 171 MMHG | DIASTOLIC BLOOD PRESSURE: 82 MMHG | HEART RATE: 64 BPM | BODY MASS INDEX: 32.96 KG/M2 | TEMPERATURE: 99 F | HEIGHT: 63 IN | WEIGHT: 186 LBS | OXYGEN SATURATION: 99 % | RESPIRATION RATE: 18 BRPM

## 2022-11-18 DIAGNOSIS — M47.812 CERVICAL SPONDYLOSIS: ICD-10-CM

## 2022-11-18 DIAGNOSIS — M54.2 NECK PAIN: ICD-10-CM

## 2022-11-18 PROCEDURE — 64634 PR DESTROY C/TH FACET JNT ADDL: ICD-10-PCS | Mod: 50,,, | Performed by: ANESTHESIOLOGY

## 2022-11-18 PROCEDURE — A4216 STERILE WATER/SALINE, 10 ML: HCPCS | Mod: PO | Performed by: ANESTHESIOLOGY

## 2022-11-18 PROCEDURE — 25000003 PHARM REV CODE 250: Mod: PO | Performed by: ANESTHESIOLOGY

## 2022-11-18 PROCEDURE — 64633 DESTROY CERV/THOR FACET JNT: CPT | Mod: 50,,, | Performed by: ANESTHESIOLOGY

## 2022-11-18 PROCEDURE — 64635 DESTROY LUMB/SAC FACET JNT: CPT | Mod: 50,PO | Performed by: ANESTHESIOLOGY

## 2022-11-18 PROCEDURE — 63600175 PHARM REV CODE 636 W HCPCS: Mod: PO | Performed by: ANESTHESIOLOGY

## 2022-11-18 PROCEDURE — 64634 DESTROY C/TH FACET JNT ADDL: CPT | Mod: 50,,, | Performed by: ANESTHESIOLOGY

## 2022-11-18 PROCEDURE — 99152 PR MOD CONSCIOUS SEDATION, SAME PHYS, 5+ YRS, FIRST 15 MIN: ICD-10-PCS | Mod: ,,, | Performed by: ANESTHESIOLOGY

## 2022-11-18 PROCEDURE — 64634 DESTROY C/TH FACET JNT ADDL: CPT | Mod: 50,PO | Performed by: ANESTHESIOLOGY

## 2022-11-18 PROCEDURE — 64633 PR DESTROY CERV/THOR FACET JNT: ICD-10-PCS | Mod: 50,,, | Performed by: ANESTHESIOLOGY

## 2022-11-18 PROCEDURE — 76000 FLUOROSCOPY <1 HR PHYS/QHP: CPT | Mod: TC,PO

## 2022-11-18 PROCEDURE — 64633 DESTROY CERV/THOR FACET JNT: CPT | Mod: 50,PO | Performed by: ANESTHESIOLOGY

## 2022-11-18 PROCEDURE — 99152 MOD SED SAME PHYS/QHP 5/>YRS: CPT | Mod: ,,, | Performed by: ANESTHESIOLOGY

## 2022-11-18 PROCEDURE — 64636 DESTROY L/S FACET JNT ADDL: CPT | Mod: 50,PO | Performed by: ANESTHESIOLOGY

## 2022-11-18 RX ORDER — MIDAZOLAM HYDROCHLORIDE 2 MG/2ML
INJECTION, SOLUTION INTRAMUSCULAR; INTRAVENOUS
Status: DISCONTINUED | OUTPATIENT
Start: 2022-11-18 | End: 2022-11-18 | Stop reason: HOSPADM

## 2022-11-18 RX ORDER — HYDROCODONE BITARTRATE AND ACETAMINOPHEN 5; 325 MG/1; MG/1
1 TABLET ORAL ONCE
Status: COMPLETED | OUTPATIENT
Start: 2022-11-18 | End: 2022-11-18

## 2022-11-18 RX ORDER — FENTANYL CITRATE 50 UG/ML
INJECTION, SOLUTION INTRAMUSCULAR; INTRAVENOUS
Status: DISCONTINUED | OUTPATIENT
Start: 2022-11-18 | End: 2022-11-18 | Stop reason: HOSPADM

## 2022-11-18 RX ORDER — LIDOCAINE HYDROCHLORIDE 20 MG/ML
INJECTION, SOLUTION EPIDURAL; INFILTRATION; INTRACAUDAL; PERINEURAL
Status: DISCONTINUED | OUTPATIENT
Start: 2022-11-18 | End: 2022-11-18 | Stop reason: HOSPADM

## 2022-11-18 RX ORDER — METHYLPREDNISOLONE ACETATE 40 MG/ML
INJECTION, SUSPENSION INTRA-ARTICULAR; INTRALESIONAL; INTRAMUSCULAR; SOFT TISSUE
Status: DISCONTINUED | OUTPATIENT
Start: 2022-11-18 | End: 2022-11-18 | Stop reason: HOSPADM

## 2022-11-18 RX ORDER — SODIUM CHLORIDE, SODIUM LACTATE, POTASSIUM CHLORIDE, CALCIUM CHLORIDE 600; 310; 30; 20 MG/100ML; MG/100ML; MG/100ML; MG/100ML
INJECTION, SOLUTION INTRAVENOUS CONTINUOUS
Status: DISCONTINUED | OUTPATIENT
Start: 2022-11-18 | End: 2022-11-18 | Stop reason: HOSPADM

## 2022-11-18 RX ORDER — HYDROCODONE BITARTRATE AND ACETAMINOPHEN 5; 325 MG/1; MG/1
1 TABLET ORAL EVERY 6 HOURS PRN
Qty: 20 TABLET | Refills: 0 | Status: SHIPPED | OUTPATIENT
Start: 2022-11-18 | End: 2023-03-23

## 2022-11-18 RX ORDER — LIDOCAINE HYDROCHLORIDE 10 MG/ML
INJECTION, SOLUTION EPIDURAL; INFILTRATION; INTRACAUDAL; PERINEURAL
Status: DISCONTINUED | OUTPATIENT
Start: 2022-11-18 | End: 2022-11-18 | Stop reason: HOSPADM

## 2022-11-18 RX ORDER — SODIUM CHLORIDE 9 MG/ML
INJECTION, SOLUTION INTRAMUSCULAR; INTRAVENOUS; SUBCUTANEOUS
Status: DISCONTINUED | OUTPATIENT
Start: 2022-11-18 | End: 2022-11-18 | Stop reason: HOSPADM

## 2022-11-18 RX ADMIN — SODIUM CHLORIDE, SODIUM LACTATE, POTASSIUM CHLORIDE, AND CALCIUM CHLORIDE: .6; .31; .03; .02 INJECTION, SOLUTION INTRAVENOUS at 08:11

## 2022-11-18 RX ADMIN — HYDROCODONE BITARTRATE AND ACETAMINOPHEN 1 TABLET: 5; 325 TABLET ORAL at 09:11

## 2022-11-18 NOTE — PLAN OF CARE
Pt VSS & all questions/concerns answered or directed to appropriate staff. Pt denies recent fever or illness. Belongings placed under stretcher, purse and glasses left with mother @ [facility]. Procedure consents signed by pt or proxy. Pt states ready for procedure.

## 2022-11-18 NOTE — OP NOTE
PROCEDURE DATE: 11/18/2022    PROCEDURE:  Radiofrequency ablation of the bilateral C5/6 and C6/7 medial branch nerves on the bilateral-side under fluoroscopy    DIAGNOSIS:  Cervical spondylosis    Post Op Diagnosis: Same    PHYSICIAN: Julius Doshi MD    MEDICATIONS INJECTED:  From a mixture of 4ml of 2% lidocaine and 40mg of methylprednisone, 1ml of this solution was injected at each level.    LOCAL ANESTHETIC USED:   1ml of lidocaine 1% at each level    SEDATION MEDICATIONS: 4mg versed, 75mcg fentanyl    ESTIMATED BLOOD LOSS:  none    COMPLICATIONS:  none    TECHNIQUE:   A time-out taken to identify patient and procedure side prior to starting the procedure.  The patient was positioned prone with the site of interest side up. The patient was prepped and draped in the usual sterile fashion using ChloraPrep and sterile towels.  The levels were determined under fluoroscopic guidance using a slightly posteriorly oblique view.   Local anesthetic was infiltrated superficially at the skin.  Then a 100 mm 22g bent tip [place] RF needle was inserted to the anatomic location of the midsection of the lateral masses of C5,6,7 on both side. Impedance was less than 800 ohms at each level. Motor stimulation up to 2 volts confirmed there was no nerve root involvement at each level. Medication was then injected slowly.  Ablation was done per level utilizing Betsy radiofrequency generator at 80°C for 90 seconds. The patient tolerated the procedure well.     The patient was monitored after the procedure.  Patient was given post procedure and discharge instructions to follow at home.  The patient was discharged in a stable condition    Event Time In   In Facility 0741   In Pre-Procedure 0757   Physician Available    Anesthesia Available    Pre-Op: Bedside Procedure Start    Pre-Op: Bedside Procedure Stop    Pre-Procedure Complete 0843   Out of Pre-Procedure    Anesthesia Start    Anesthesia Start Data Collection    Setup Start     Setup Complete    In Room 0836   Prep Start    Procedure Prep Complete    Procedure Start 0841   Procedure Closing    Emergence    Procedure Finish 0911   Sedation Start 0835   Scope In    Extent Reached    Scope Out    Sedation End 0911   Out of Room    Cleanup Start    Cleanup Complete    Cosmetic Start    Cosmetic Stop    Pain Mgmt In Room    Pain Mgmt Out Room    In Recovery    Anesthesia Finish    Bedside Procedure Start    Bedside Procedure Stop    Recovery Care Complete    Out of Recovery    To Phase II    In Phase II    Pain Mgmt Recovery Start    Pain Mgmt Recovery Stop    Obs Rec Start    Obs Rec Stop    Phase II Care Complete    Out of Phase II    Procedural Care Complete    Discharge    Pain Follow Up Needed    Pain Follow Up Complete      Moderate sedation was achieved with midazolam 4 mg and fentanyl 75 mcg.  Continuous monitoring of EKG, blood pressure and pulse oximetry was provided by a registered nurse during the entire course of the procedure under my supervision and recorded in the patient's medical record.   Total time for sedation was 36 minutes.

## 2022-11-18 NOTE — DISCHARGE SUMMARY
[facility]  Discharge Note  Short Stay    Procedure(s) (LRB):  Radiofrequency Ablation, Cervical, C5/6, C6/7,, ZAHIDA. (Bilateral)      OUTCOME: Patient tolerated treatment/procedure well without complication and is now ready for discharge.    DISPOSITION: Home or Self Care    FINAL DIAGNOSIS:  Cervical spondylosis    FOLLOWUP: In clinic    DISCHARGE INSTRUCTIONS:    Discharge Procedure Orders   Diet Adult Regular     No dressing needed     Notify your health care provider if you experience any of the following:  temperature >100.4     Activity as tolerated

## 2022-11-18 NOTE — PLAN OF CARE
Patient tolerating oral liquids without difficulty. No apparent signs and/or symptoms of distress noted at this time. No complaints voiced at this time. Discharge instructions reviewed with patient/family/friend with good verbal feedback received. Patient ready for discharge

## 2022-11-18 NOTE — H&P
CC: Neck pain    HPI: The patient is a 52 with a history of cervical spondylosis here for bilateral C5/6 and C6/7 RFA. There are no major changes in history and physical from 22.    Past Medical History:   Diagnosis Date    Abdominal bruit     Abnormal EKG     Anxiety     Coronary atherosclerosis 2016    Environmental and seasonal allergies     Family history of cardiovascular disease     Fibroids     uterine    GERD (gastroesophageal reflux disease)     Heart murmur     History of chicken pox     Hyperlipidemia     Hypertension     Migraine     Overactive bladder     PVC (premature ventricular contraction)     Sleep apnea     doesn not wear CPAP       Past Surgical History:   Procedure Laterality Date    AUGMENTATION OF BREAST  2008    BREAST BIOPSY Left 2015    benign needle biopsy of lymph node. pt still feels this node 2020    BREAST SURGERY  2009     SECTION      X 2    COLONOSCOPY N/A 2022    Procedure: COLONOSCOPY;  Surgeon: Jorge Luis Brown MD;  Location: [facility];  Service: Endoscopy;  Laterality: N/A;    ESOPHAGOGASTRODUODENOSCOPY N/A 2022    Procedure: EGD (ESOPHAGOGASTRODUODENOSCOPY);  Surgeon: Jorge Luis Brown MD;  Location: [facility] ENDO;  Service: Endoscopy;  Laterality: N/A;    HYSTERECTOMY      INJECTION OF ANESTHETIC AGENT AROUND MEDIAL BRANCH NERVES INNERVATING CERVICAL FACET JOINT Bilateral 10/3/2022    Procedure: Block-nerve-medial branch-cervical C5/6, C6/7;  Surgeon: Julius Doshi MD;  Location: [facility] OR;  Service: Pain Management;  Laterality: Bilateral;    INJECTION OF ANESTHETIC AGENT AROUND MEDIAL BRANCH NERVES INNERVATING CERVICAL FACET JOINT Bilateral 10/13/2022    Procedure: Block-nerve-medial branch-cervical, C5/6, C6/7;  Surgeon: Julius Doshi MD;  Location: [facility] OR;  Service: Pain Management;  Laterality: Bilateral;    LASIK      LYMPH NODE BIOPSY      Dr. Byrne     RHINOPLASTY TIP         Family History   Problem Relation Age of Onset  "   Hypertension Father     Heart disease Father 47    Hypertension Paternal Grandfather     Breast cancer Paternal Grandmother 67        approx age    Breast cancer Other 67        approx age       Social History     Socioeconomic History    Marital status:    Tobacco Use    Smoking status: Never    Smokeless tobacco: Never   Substance and Sexual Activity    Alcohol use: Yes     Comment: occasionally, increased since COVID    Drug use: Never    Sexual activity: Yes     Partners: Male       No current facility-administered medications for this encounter.       Review of patient's allergies indicates:   Allergen Reactions    Ibuprofen Rash    Adhesive Blisters    Erythromycin lactobionate     Medrol (mell) [methylprednisolone]      Elevated Blood pressure       Nickel     Nickel sutures [surgical stainless steel]     Shellfish containing products     Erythromycin Rash    Pcn [penicillins] Rash       Vitals:    11/16/22 1527 11/18/22 0822   BP:  (!) 184/88   Pulse:  73   Resp:  17   Temp:  98.4 °F (36.9 °C)   SpO2:  98%   Weight: 84.4 kg (186 lb)    Height: 5' 3" (1.6 m)        ASA 2, Mallampati 2    REVIEW OF SYSTEMS:     GENERAL: No weight loss, malaise or fevers.  HEENT:  No recent changes in vision or hearing  NECK: Negative for lumps, no difficulty with swallowing.  RESPIRATORY: Negative for cough, wheezing or shortness of breath, patient denies any recent URI.  CARDIOVASCULAR: Negative for chest pain, leg swelling or palpitations.  GI: Negative for abdominal discomfort, blood in stools or black stools or change in bowel habits.  MUSCULOSKELETAL: See HPI.  SKIN: Negative for lesions, rash, and itching.  PSYCH: No suicidal or homicidal ideations, no current mood disturbances.  HEMATOLOGY/LYMPHOLOGY: Negative for prolonged bleeding, bruising easily or swollen nodes. Patient is not currently taking any anti-coagulants  ENDO: No history of diabetes or thyroid dysfunction  NEURO: No history of syncope, paralysis, " seizures or tremors.All other reviewed and negative other than HPI.    Physical exam:  Gen: A and O x3, pleasant, well-groomed  Skin: No rashes or obvious lesions  HEENT: PERRLA, no obvious deformities on ears or in canals. No thyroid masses, trachea midline, no palpable lymph nodes in neck, axilla.  CVS: Regular rate and rhythm, normal S1 and S2, no murmurs.  Resp: Clear to auscultation bilaterally.  Abdomen: Soft, NT/ND, normal bowel sounds present.  Musculoskeletal/Neuro: Moving all extremities    Assessment:  Cervical spondylosis  -     Case Request Operating Room: Radiofrequency Ablation, Cervical, C5/6, C6/7,, ZAHIDA.  -     Vital signs; Standing  -     Place 18-22 gauage peripheral IV ; Standing  -     Verify informed consent; Standing  -     Notify physician ; Standing  -     Notify physician ; Standing  -     Notify physician (specify); Standing  -     Diet NPO; Standing  -     Place in Outpatient; Standing  -     lactated ringers infusion    Other orders  -     IP VTE LOW RISK PATIENT; Standing

## 2022-12-05 ENCOUNTER — OFFICE VISIT (OUTPATIENT)
Dept: PAIN MEDICINE | Facility: CLINIC | Age: 52
End: 2022-12-05
Payer: COMMERCIAL

## 2022-12-05 VITALS
DIASTOLIC BLOOD PRESSURE: 78 MMHG | BODY MASS INDEX: 32.84 KG/M2 | HEART RATE: 72 BPM | SYSTOLIC BLOOD PRESSURE: 157 MMHG | HEIGHT: 63 IN | WEIGHT: 185.31 LBS

## 2022-12-05 DIAGNOSIS — M47.812 CERVICAL SPONDYLOSIS: Primary | ICD-10-CM

## 2022-12-05 DIAGNOSIS — M50.30 DDD (DEGENERATIVE DISC DISEASE), CERVICAL: ICD-10-CM

## 2022-12-05 PROCEDURE — 1160F RVW MEDS BY RX/DR IN RCRD: CPT | Mod: CPTII,S$GLB,, | Performed by: PHYSICIAN ASSISTANT

## 2022-12-05 PROCEDURE — 3078F DIAST BP <80 MM HG: CPT | Mod: CPTII,S$GLB,, | Performed by: PHYSICIAN ASSISTANT

## 2022-12-05 PROCEDURE — 1159F MED LIST DOCD IN RCRD: CPT | Mod: CPTII,S$GLB,, | Performed by: PHYSICIAN ASSISTANT

## 2022-12-05 PROCEDURE — 3077F PR MOST RECENT SYSTOLIC BLOOD PRESSURE >= 140 MM HG: ICD-10-PCS | Mod: CPTII,S$GLB,, | Performed by: PHYSICIAN ASSISTANT

## 2022-12-05 PROCEDURE — 1160F PR REVIEW ALL MEDS BY PRESCRIBER/CLIN PHARMACIST DOCUMENTED: ICD-10-PCS | Mod: CPTII,S$GLB,, | Performed by: PHYSICIAN ASSISTANT

## 2022-12-05 PROCEDURE — 3078F PR MOST RECENT DIASTOLIC BLOOD PRESSURE < 80 MM HG: ICD-10-PCS | Mod: CPTII,S$GLB,, | Performed by: PHYSICIAN ASSISTANT

## 2022-12-05 PROCEDURE — 3008F PR BODY MASS INDEX (BMI) DOCUMENTED: ICD-10-PCS | Mod: CPTII,S$GLB,, | Performed by: PHYSICIAN ASSISTANT

## 2022-12-05 PROCEDURE — 99212 OFFICE O/P EST SF 10 MIN: CPT | Mod: S$GLB,,, | Performed by: PHYSICIAN ASSISTANT

## 2022-12-05 PROCEDURE — 99212 PR OFFICE/OUTPT VISIT, EST, LEVL II, 10-19 MIN: ICD-10-PCS | Mod: S$GLB,,, | Performed by: PHYSICIAN ASSISTANT

## 2022-12-05 PROCEDURE — 3008F BODY MASS INDEX DOCD: CPT | Mod: CPTII,S$GLB,, | Performed by: PHYSICIAN ASSISTANT

## 2022-12-05 PROCEDURE — 99999 PR PBB SHADOW E&M-EST. PATIENT-LVL III: CPT | Mod: PBBFAC,,, | Performed by: PHYSICIAN ASSISTANT

## 2022-12-05 PROCEDURE — 1159F PR MEDICATION LIST DOCUMENTED IN MEDICAL RECORD: ICD-10-PCS | Mod: CPTII,S$GLB,, | Performed by: PHYSICIAN ASSISTANT

## 2022-12-05 PROCEDURE — 99999 PR PBB SHADOW E&M-EST. PATIENT-LVL III: ICD-10-PCS | Mod: PBBFAC,,, | Performed by: PHYSICIAN ASSISTANT

## 2022-12-05 PROCEDURE — 4010F PR ACE/ARB THEARPY RXD/TAKEN: ICD-10-PCS | Mod: CPTII,S$GLB,, | Performed by: PHYSICIAN ASSISTANT

## 2022-12-05 PROCEDURE — 3077F SYST BP >= 140 MM HG: CPT | Mod: CPTII,S$GLB,, | Performed by: PHYSICIAN ASSISTANT

## 2022-12-05 PROCEDURE — 4010F ACE/ARB THERAPY RXD/TAKEN: CPT | Mod: CPTII,S$GLB,, | Performed by: PHYSICIAN ASSISTANT

## 2022-12-05 NOTE — PROGRESS NOTES
This note was completed with dictation software and grammatical errors may exist.    Chief Complaint   Patient presents with    Neck Pain     C/o neck pain  Follow-up from cervical RFA         HPI: Lupe Hein is a 52 y.o. year old female patient who has a past medical history of Abdominal bruit, Abnormal EKG, Anxiety, Coronary atherosclerosis, Environmental and seasonal allergies, Family history of cardiovascular disease, Fibroids, GERD (gastroesophageal reflux disease), Heart murmur, History of chicken pox, Hyperlipidemia, Hypertension, Migraine, Overactive bladder, PVC (premature ventricular contraction), and Sleep apnea. She presents in referral from No ref. provider found for neck pain.  She is status post bilateral C5/6 and C6/7 medial branch radiofrequency ablation on 11/18/2022 with 95% relief.  The patient is new to me.  She reports minimal neck pain but attributes this to coughing from the flu recently.  She currently denies any weakness or numbness, denies bladder or bowel incontinence.    Previous history:  Sates that she has had pain for years, probably greater than 3 years.  Is located the midline neck, does not radiate out into the arms but does report some numbness and tingling in her left thumb and 5th finger at times.  Otherwise she denies any radiation down the arms.  She does have some numbness and tingling in both hands when she sleeps at night.  Otherwise her pain is worse when she 1st wakes up in the morning, feels very stiff, improved slightly throughout the day but then worsens if she is been sitting for a long time.  She does feel that her balance has been off at times but denies any gentry weakness.  She would gone through physical therapy in the past without much relief.  She has tried NSAIDs but has developed an allergy to them.  She has taken hydrocodone at times.  She had an MRI performed in May, 2022 that shows significant degenerative changes at C5/6 and C6/7.  She has a  history of neck pain, had seen Dr. Lane Oliveira, spine surgeon who recommended bilateral C5/6 and C6/7 radiofrequency ablation.  She also reports pain all over body, she feels that she has pain in her back, all of her joints when she wakes up in the morning.    Pain intervention history:  She is status post bilateral C5/6 and C6/7 medial branch radiofrequency ablation on 2022 with 95% relief.     Spine surgeries:  None    Antineuropathics:  NSAIDs:  Physical therapy:  Antidepressants:  Zoloft 25  Muscle relaxers:  Opioids:  Hydrocodone 10/325  Antiplatelets/Anticoagulants:  None    ROS:  She reports neck pain, joint stiffness, joint pain.  Balance of review of systems is negative.    No results found for: LABA1C, HGBA1C    Lab Results   Component Value Date    WBC 6.0 10/07/2022    HGB 13.2 10/07/2022    HCT 40.2 10/07/2022    MCV 88.5 10/07/2022     10/07/2022             Past Medical History:   Diagnosis Date    Abdominal bruit     Abnormal EKG     Anxiety     Coronary atherosclerosis 2016    Environmental and seasonal allergies     Family history of cardiovascular disease     Fibroids     uterine    GERD (gastroesophageal reflux disease)     Heart murmur     History of chicken pox     Hyperlipidemia     Hypertension     Migraine     Overactive bladder     PVC (premature ventricular contraction)     Sleep apnea     doesn not wear CPAP       Past Surgical History:   Procedure Laterality Date    AUGMENTATION OF BREAST  2008    BREAST BIOPSY Left 2015    benign needle biopsy of lymph node. pt still feels this node 2020    BREAST SURGERY  2009     SECTION      X 2    COLONOSCOPY N/A 2022    Procedure: COLONOSCOPY;  Surgeon: Jorge Luis Brown MD;  Location: [facility];  Service: Endoscopy;  Laterality: N/A;    ESOPHAGOGASTRODUODENOSCOPY N/A 2022    Procedure: EGD (ESOPHAGOGASTRODUODENOSCOPY);  Surgeon: Jorge Luis Brown MD;  Location: [facility];  Service: Endoscopy;   "Laterality: N/A;    HYSTERECTOMY      INJECTION OF ANESTHETIC AGENT AROUND MEDIAL BRANCH NERVES INNERVATING CERVICAL FACET JOINT Bilateral 10/3/2022    Procedure: Block-nerve-medial branch-cervical C5/6, C6/7;  Surgeon: Julius Doshi MD;  Location: [facility] OR;  Service: Pain Management;  Laterality: Bilateral;    INJECTION OF ANESTHETIC AGENT AROUND MEDIAL BRANCH NERVES INNERVATING CERVICAL FACET JOINT Bilateral 10/13/2022    Procedure: Block-nerve-medial branch-cervical, C5/6, C6/7;  Surgeon: Julius Doshi MD;  Location: [facility] OR;  Service: Pain Management;  Laterality: Bilateral;    LASIK      LYMPH NODE BIOPSY      Dr. Byrne     RADIOFREQUENCY ABLATION Bilateral 11/18/2022    Procedure: Radiofrequency Ablation, Cervical, C5/6, C6/7,, ZAHIDA.;  Surgeon: Julius Doshi MD;  Location: [facility] OR;  Service: Pain Management;  Laterality: Bilateral;    RHINOPLASTY TIP         Social History     Socioeconomic History    Marital status:    Tobacco Use    Smoking status: Never    Smokeless tobacco: Never   Substance and Sexual Activity    Alcohol use: Yes     Comment: occasionally, increased since COVID    Drug use: Never    Sexual activity: Yes     Partners: Male         Medications/Allergies: See med card    Vitals:    12/05/22 1413   BP: (!) 157/78   Pulse: 72   Weight: 84.1 kg (185 lb 4.8 oz)   Height: 5' 3" (1.6 m)   PainSc:   1   PainLoc: Neck     Body mass index is 32.82 kg/m².    Physical exam:  Gen: A and O x3, pleasant, well-groomed  Skin: No rashes or obvious lesions  HEENT: PERRLA, no obvious deformities on ears or in canals.Trachea midline.  CVS: Regular rate and rhythm, normal palpable pulses.  Resp: Clear to auscultation bilaterally, no wheezes or rales.  Abdomen: Soft, NT/ND.  Musculoskeletal: No antalgic gait.   Coordination   Romberg:  Slight imbalance  Tandem walking coordination: normal    Neuro:  Motor:    Right Left   C4 Shoulder Abduction  5  5   C5 Elbow Flexion    5  5   C6 Wrist " Extension  5  5   C7 Elbow Extension   5  5   C8/T1 Hand Intrinsics   5  5   C8 First Dorsal Interosseus  5  5   C8 Abductor Pollicus Brevis  5  5      Left  Right    Triceps DTR 2+ 2+   Biceps DTR 2+ 2+   Brachioradialis DTR 2+ 2+   Patellar DTR 2+ 3+   Achilles DTR 2+ 2+   Ghotra Absent  Absent               Sensory: Intact and symmetrical to light touch and pinprick in C2-T1 dermatomes bilaterally.  Cervical spine: ROM is full in flexion, extension and lateral rotation with increased pain on flexion and also extension and especially oblique extension to the left greater than right side causing pain   Spurling's maneuver causes no neck pain to either side.  Myofascial exam: No Tenderness to palpation across cervical paraspinous region bilaterally.    Imagin22 MRI C-spine:  The cervical vertebral body heights appear to be preserved.  There appears to be minimal 1 mm retrolisthesis of C5 on C6 and C6 on C7.  There is reversal the normal cervical lordosis which could be related to muscular spasm and/or positioning.  Modic 1 endplate degenerative signal changes are seen at the opposing endplates of C6-C7 with likely reactive edema extending into the left greater than right pedicles.  Similar milder findings are noted on the previous MRI from 2019. There is patchy increased T2 signal within the inferior posterior right mastoid air cells compatible with small right mastoid effusion.   The cervical cord demonstrates no definite abnormal T2 signal suggestive of definite myelomalacia or cord edema.   C2-C3 demonstrates no significant posterior disc protrusion, central spinal canal stenosis, or neural foraminal stenosis.   C3-C4 demonstrates mild broad-based posterior disc osteophyte complex and small central annular fissure.  There is effacement of the ventral thecal sac with mild overall central spinal canal stenosis.  The AP diameter of the central thecal sac measures approximately 8 mm.  No significant neural  foraminal stenosis is seen..   C4-C5 demonstrates mild broad-based posterior disc osteophyte complex with effacement of the ventral thecal sac and mild overall central spinal canal stenosis.  Moderate left uncovertebral joint hypertrophy is seen.  Moderate left neural foraminal narrowing is similar to the previous study.   C5-C6 demonstrates moderate to severe disc space narrowing, mild broad-based posterior disc osteophyte complex, moderate bilateral uncovertebral joint hypertrophy, and mild bilateral facet arthrosis with mild overall central spinal canal stenosis.  Moderate bilateral neural foraminal narrowing is again seen.   C6-C7 demonstrates moderate disc space narrowing, moderate to severe broad-based posterior disc osteophyte complex, mild-to-moderate bilateral uncovertebral joint hypertrophy.  There is effacement of the ventral thecal sac with mild to moderate overall central spinal canal stenosis with the AP diameter of the central thecal sac measuring approximately 7-8 mm.  Mild-to-moderate right and mild left neural foraminal narrowing is noted..   C7-T1 demonstrates no significant posterior disc protrusion, central spinal canal stenosis, or neural foraminal stenosis.    Assessment:  Lupe Hein is a 52 y.o. year old female patient who has a past medical history of Abdominal bruit, Abnormal EKG, Anxiety, Coronary atherosclerosis, Environmental and seasonal allergies, Family history of cardiovascular disease, Fibroids, GERD (gastroesophageal reflux disease), Heart murmur, History of chicken pox, Hyperlipidemia, Hypertension, Migraine, Overactive bladder, and PVC (premature ventricular contraction). She presents in referral from Dr. Tee Carrington for neck pain.    1. Cervical spondylosis        2. DDD (degenerative disc disease), cervical            Plan:  1. The patient had almost complete relief following the bilateral C5/6 and C6/7 medial branch radiofrequency ablation.  This can be repeated  in the future if necessary.    2. Follow-up as needed.

## 2023-03-08 ENCOUNTER — TELEPHONE (OUTPATIENT)
Dept: RHEUMATOLOGY | Facility: CLINIC | Age: 53
End: 2023-03-08
Payer: COMMERCIAL

## 2023-03-09 ENCOUNTER — PATIENT MESSAGE (OUTPATIENT)
Dept: RHEUMATOLOGY | Facility: CLINIC | Age: 53
End: 2023-03-09
Payer: COMMERCIAL

## 2023-04-10 ENCOUNTER — TELEPHONE (OUTPATIENT)
Dept: RHEUMATOLOGY | Facility: CLINIC | Age: 53
End: 2023-04-10
Payer: COMMERCIAL

## 2023-04-13 NOTE — PROGRESS NOTES
"Subjective:      Patient ID: Lupe Hein is a 52 y.o. female.    Chief Complaint: Disease Management    HPI    This is a 52-year-old woman with history of HTN, HLD, GERD, anxiety, and cervical spondylosis, and squamous cell CA on the nose s/p resection who was referred to Rheumatology for positive YRIS 1:80 nuclear and speckled, and family history of lupus. She states that over the years, she has had migratory polyarthralgia, shocking sensation at the bottom of the right foot (tarsal tunnel syndrome on EMG), intermittent rashes ("medicine induced rash" on biopsy), photosensitivity (causes itching), allodynia. She states she was recently diagnosed with colitis on CT abdomen (4/2023) now on antibiotics. She does have dry mouth. She has been feeling warm behind the eyes, diarrhea and intermittent bloody stool (scheduled to follow up with GI at the end of the month; colonoscopy negative 10/2022). Currently, she reports pain in her right wrist and right ankle, occasional swelling in the ankles, and generalized weakness.    The patient denies fevers, patchy alopecia, oral/nasal ulcers, pleuritic chest pain, shortness of breath, cough, weakness, and Raynaud's.     Treatment History:  Flexeril: made her sleepy    Widespread pain index  Note the areas which the patient has had pain over the last week:                   Shoulder-girdle, left: 0               Shoulder-girdle, right: 0                         Upper arm left: 0                       Upper arm right: 0                         Lower arm left: 1                       Lower arm right 1    Hip (buttock, trochanter) left 0  Hip (buttock, trochanter) right 0                           Upper leg, left 0                         Upper leg, right 0                           Lower leg, left 1                         Lower leg, right 1                                     Jaw, left 0                                   Jaw, right 0                                        " "Chest 0                                  Abdomen 1                               Upper back 1                              Lower back 1                                        Neck 1  Score will be from 0-19: 8                                         Symptom severity score  Fatigue 2.5  Waking Unrefreshed 2.5  Cognitive Symptoms 3   0 = no problem, 1=slight or mild problem 2= moderate; considerable problems often present and/or at a moderate level, 3 = severe, pervasive, continuous, life disturbing problem  For each of the 3 symptoms, indicate the level of severity over the past week using the Scale.  The symptom severity score is the sum of the severity of the 3 symptoms (fatigue, waking unrefreshed, and cognitive symptoms) plus the number of the following symptoms occurring during the previous 6 months:   Headaches 1  Pain or cramps in the lower abdomen 1  Depression 0  The final score is between 0 and 12: 10                                      Criteria  Patient has fibromyalgia if the following 3 conditions are met:  1.  Widespread pain index greater than or equal to 7 and symptom severity score greater than or equal to 5 or widespread pain index between 3- 6, and symptom severity score greater than or equal to 9.    2.  Symptoms have been present in a similar level for at least 3 months  3.  The patient does not have a disorder that would otherwise sufficiently explain the pain    Family History:  Lupus: mother    Gyn History:    ()  No VTE     Objective:   /83   Pulse 70   Ht 5' 2.99" (1.6 m)   Wt 78 kg (172 lb)   BMI 30.48 kg/m²   Physical Exam   Constitutional: normal appearance.   HENT:   Head: Normocephalic and atraumatic.   Cardiovascular: Normal rate, regular rhythm and normal heart sounds.   Pulmonary/Chest: Effort normal and breath sounds normal.   Musculoskeletal:      Comments: No synovitis, dactylitis, enthesitis, effusions  Strength 5/5 bilateral upper and lower extremities " "  Neurological: She is alert.   Skin: Skin is warm and dry.   No skin thickening, telangiectasias, calcinosis, psoriasiform lesions, lupoid lesions       No data to display     Labs reviewed by me:   positive YRIS 1:80 nuclear and speckled without panel  Negative RF  CBC (4/2023) WNL   CMP WNL  UA WNL   ESR (10/2022) WNL   CRP (10/2022) 10.1    Assessment:     1. Positive YRIS (antinuclear antibody)    2. Family history of systemic lupus erythematosus    3. CRP elevated    4. Fibromyalgia    5. Obesity (BMI 30.0-34.9)    6. Fatigue, unspecified type    7. Snoring      This is a 52-year-old woman with history of HTN, HLD, GERD, anxiety, and cervical spondylosis, and squamous cell CA on the nose s/p resection who was referred to Rheumatology for positive YRIS 1:80 nuclear and speckled, and family history of lupus. She states that over the years, she has had migratory polyarthralgia, shocking sensation at the bottom of the right foot (tarsal tunnel syndrome on EMG), intermittent rashes ("medicine induced rash" on biopsy), photosensitivity (causes itching), allodynia. She states she was recently diagnosed with colitis on CT abdomen (4/2023) now on antibiotics. She does have dry mouth. She has been feeling warm behind the eyes, diarrhea and intermittent bloody stool (scheduled to follow up with GI at the end of the month; colonoscopy negative 10/2022). Currently, she reports pain in her right wrist and right ankle, occasional swelling in the ankles, and generalized weakness. Physical examination is unremarkable. I suspect that the YRIS is false positive and have low suspicion for active CTD. She meets criteria for fibromyalgia. I discussed the importance of regular aerobic exercise and screening for sleep apnea.  Will have her try baclofen.    Plan:     Problem List Items Addressed This Visit    None  Visit Diagnoses       Positive YRIS (antinuclear antibody)    -  Primary    Family history of systemic lupus erythematosus     "    CRP elevated        Fibromyalgia        Relevant Medications    baclofen (LIORESAL) 10 MG tablet    Other Relevant Orders    Ambulatory referral/consult to Physical/Occupational Therapy    Obesity (BMI 30.0-34.9)        Relevant Orders    Ambulatory referral/consult to Sleep Disorders    Fatigue, unspecified type        Relevant Orders    Ambulatory referral/consult to Sleep Disorders    Snoring        Relevant Orders    Ambulatory referral/consult to Sleep Disorders          - Trial of Baclofen  - Patient may consider speaking to PCP about switching antidepressants  - Potential therapeutic options include, Cymbalta, Savella, Elavil, gabapentin, Lyrica, naltrexone 4.5mg, and Tramadol  - Recommend good sleep hygiene, regular aerobic exercise (30 minutes per day 5 days per week), Apollo chi, yoga  - refer  to sleep medicine  - Refer to physical therapy (aquatic therapy)    Follow up in 6 months    60 minutes of total time spent on the encounter, which includes face to face time and non-face to face time preparing to see the patient (eg, review of tests), Obtaining and/or reviewing separately obtained history, Documenting clinical information in the electronic or other health record, Independently interpreting results (not separately reported) and communicating results to the patient/family/caregiver, or Care coordination (not separately reported).       Lizbeth Pina M.D.  Rheumatology Dept  [place], [place]

## 2023-04-14 ENCOUNTER — OFFICE VISIT (OUTPATIENT)
Dept: RHEUMATOLOGY | Facility: CLINIC | Age: 53
End: 2023-04-14
Payer: COMMERCIAL

## 2023-04-14 VITALS
DIASTOLIC BLOOD PRESSURE: 83 MMHG | HEART RATE: 70 BPM | WEIGHT: 172 LBS | HEIGHT: 63 IN | SYSTOLIC BLOOD PRESSURE: 137 MMHG | BODY MASS INDEX: 30.48 KG/M2

## 2023-04-14 DIAGNOSIS — R53.83 FATIGUE, UNSPECIFIED TYPE: ICD-10-CM

## 2023-04-14 DIAGNOSIS — Z82.69 FAMILY HISTORY OF SYSTEMIC LUPUS ERYTHEMATOSUS: ICD-10-CM

## 2023-04-14 DIAGNOSIS — R76.8 POSITIVE ANA (ANTINUCLEAR ANTIBODY): Primary | ICD-10-CM

## 2023-04-14 DIAGNOSIS — M79.7 FIBROMYALGIA: ICD-10-CM

## 2023-04-14 DIAGNOSIS — E66.9 OBESITY (BMI 30.0-34.9): ICD-10-CM

## 2023-04-14 DIAGNOSIS — R79.82 CRP ELEVATED: ICD-10-CM

## 2023-04-14 DIAGNOSIS — R06.83 SNORING: ICD-10-CM

## 2023-04-14 PROCEDURE — 3008F PR BODY MASS INDEX (BMI) DOCUMENTED: ICD-10-PCS | Mod: CPTII,S$GLB,, | Performed by: STUDENT IN AN ORGANIZED HEALTH CARE EDUCATION/TRAINING PROGRAM

## 2023-04-14 PROCEDURE — 99205 PR OFFICE/OUTPT VISIT, NEW, LEVL V, 60-74 MIN: ICD-10-PCS | Mod: S$GLB,,, | Performed by: STUDENT IN AN ORGANIZED HEALTH CARE EDUCATION/TRAINING PROGRAM

## 2023-04-14 PROCEDURE — 3075F PR MOST RECENT SYSTOLIC BLOOD PRESS GE 130-139MM HG: ICD-10-PCS | Mod: CPTII,S$GLB,, | Performed by: STUDENT IN AN ORGANIZED HEALTH CARE EDUCATION/TRAINING PROGRAM

## 2023-04-14 PROCEDURE — 99205 OFFICE O/P NEW HI 60 MIN: CPT | Mod: S$GLB,,, | Performed by: STUDENT IN AN ORGANIZED HEALTH CARE EDUCATION/TRAINING PROGRAM

## 2023-04-14 PROCEDURE — 4010F PR ACE/ARB THEARPY RXD/TAKEN: ICD-10-PCS | Mod: CPTII,S$GLB,, | Performed by: STUDENT IN AN ORGANIZED HEALTH CARE EDUCATION/TRAINING PROGRAM

## 2023-04-14 PROCEDURE — 3079F DIAST BP 80-89 MM HG: CPT | Mod: CPTII,S$GLB,, | Performed by: STUDENT IN AN ORGANIZED HEALTH CARE EDUCATION/TRAINING PROGRAM

## 2023-04-14 PROCEDURE — 1160F RVW MEDS BY RX/DR IN RCRD: CPT | Mod: CPTII,S$GLB,, | Performed by: STUDENT IN AN ORGANIZED HEALTH CARE EDUCATION/TRAINING PROGRAM

## 2023-04-14 PROCEDURE — 99999 PR PBB SHADOW E&M-EST. PATIENT-LVL V: ICD-10-PCS | Mod: PBBFAC,,, | Performed by: STUDENT IN AN ORGANIZED HEALTH CARE EDUCATION/TRAINING PROGRAM

## 2023-04-14 PROCEDURE — 1159F PR MEDICATION LIST DOCUMENTED IN MEDICAL RECORD: ICD-10-PCS | Mod: CPTII,S$GLB,, | Performed by: STUDENT IN AN ORGANIZED HEALTH CARE EDUCATION/TRAINING PROGRAM

## 2023-04-14 PROCEDURE — 99999 PR PBB SHADOW E&M-EST. PATIENT-LVL V: CPT | Mod: PBBFAC,,, | Performed by: STUDENT IN AN ORGANIZED HEALTH CARE EDUCATION/TRAINING PROGRAM

## 2023-04-14 PROCEDURE — 3008F BODY MASS INDEX DOCD: CPT | Mod: CPTII,S$GLB,, | Performed by: STUDENT IN AN ORGANIZED HEALTH CARE EDUCATION/TRAINING PROGRAM

## 2023-04-14 PROCEDURE — 3075F SYST BP GE 130 - 139MM HG: CPT | Mod: CPTII,S$GLB,, | Performed by: STUDENT IN AN ORGANIZED HEALTH CARE EDUCATION/TRAINING PROGRAM

## 2023-04-14 PROCEDURE — 4010F ACE/ARB THERAPY RXD/TAKEN: CPT | Mod: CPTII,S$GLB,, | Performed by: STUDENT IN AN ORGANIZED HEALTH CARE EDUCATION/TRAINING PROGRAM

## 2023-04-14 PROCEDURE — 1160F PR REVIEW ALL MEDS BY PRESCRIBER/CLIN PHARMACIST DOCUMENTED: ICD-10-PCS | Mod: CPTII,S$GLB,, | Performed by: STUDENT IN AN ORGANIZED HEALTH CARE EDUCATION/TRAINING PROGRAM

## 2023-04-14 PROCEDURE — 1159F MED LIST DOCD IN RCRD: CPT | Mod: CPTII,S$GLB,, | Performed by: STUDENT IN AN ORGANIZED HEALTH CARE EDUCATION/TRAINING PROGRAM

## 2023-04-14 PROCEDURE — 3079F PR MOST RECENT DIASTOLIC BLOOD PRESSURE 80-89 MM HG: ICD-10-PCS | Mod: CPTII,S$GLB,, | Performed by: STUDENT IN AN ORGANIZED HEALTH CARE EDUCATION/TRAINING PROGRAM

## 2023-04-14 RX ORDER — ONDANSETRON 8 MG/1
8 TABLET, ORALLY DISINTEGRATING ORAL EVERY 12 HOURS PRN
COMMUNITY
Start: 2023-04-06 | End: 2024-03-01 | Stop reason: ALTCHOICE

## 2023-04-14 RX ORDER — BACLOFEN 10 MG/1
10 TABLET ORAL 3 TIMES DAILY PRN
Qty: 90 TABLET | Refills: 2 | Status: SHIPPED | OUTPATIENT
Start: 2023-04-14 | End: 2023-10-26

## 2023-04-14 RX ORDER — DICYCLOMINE HYDROCHLORIDE 10 MG/1
10 CAPSULE ORAL
COMMUNITY
Start: 2023-04-06 | End: 2023-05-26

## 2023-04-14 ASSESSMENT — ROUTINE ASSESSMENT OF PATIENT INDEX DATA (RAPID3)
FATIGUE SCORE: 1.1
MDHAQ FUNCTION SCORE: 0.5
PAIN SCORE: 4.5
PSYCHOLOGICAL DISTRESS SCORE: 2.2
PATIENT GLOBAL ASSESSMENT SCORE: 7
TOTAL RAPID3 SCORE: 4.39

## 2023-08-10 ENCOUNTER — PATIENT MESSAGE (OUTPATIENT)
Dept: PAIN MEDICINE | Facility: CLINIC | Age: 53
End: 2023-08-10
Payer: COMMERCIAL

## 2023-08-11 RX ORDER — HYDROCODONE BITARTRATE AND ACETAMINOPHEN 5; 325 MG/1; MG/1
1 TABLET ORAL EVERY 12 HOURS PRN
Qty: 30 TABLET | Refills: 0 | Status: SHIPPED | OUTPATIENT
Start: 2023-08-11 | End: 2023-11-29 | Stop reason: SDUPTHER

## 2023-10-12 NOTE — PROGRESS NOTES
"Subjective:      Patient ID: Lupe Hein is a 53 y.o. female.    Chief Complaint: Disease Management    HPI    Rheumatologic History:     - Diagnosis/es:   - Fibromyalgia  - Family History: Lupus: mother  - Gyn History:  (); No VTE  - Positive serologies: YRIS 1:80 nuclear and speckled   - Negative serologies: RF, CCP  - Infectious screening labs: -  - Imaging: -  - Previous Treatments:   - Flexeril: made her sleepy   - Baclofen: made her sleepy  - Current Treatments:   Interval History:   At last visit, she was referred to physical therapy and sleep medicine.  She has not been able to see sleep medicine or go for physical therapy.  She still reports chronic widespread pain and significant fatigue.  She also reports new jolts at night in random parts of her body.  She clarifies that these are not electric jolts but rather sudden spasm-like sensations in different parts of her body.    Objective:   BP (!) 144/89   Pulse 68   Ht 5' 3" (1.6 m)   Wt 82 kg (180 lb 12.4 oz)   BMI 32.02 kg/m²   Physical Exam   Constitutional: normal appearance.   HENT:   Head: Normocephalic and atraumatic.   Cardiovascular: Normal rate, regular rhythm and normal heart sounds.   Pulmonary/Chest: Effort normal and breath sounds normal.   Musculoskeletal:      Comments: No synovitis, dactylitis, enthesitis, effusions     Neurological: She is alert.   Skin: Skin is warm and dry. No rash noted.       No data to display     Assessment:     1. Fibromyalgia    2. Family history of systemic lupus erythematosus    3. Positive YRIS (antinuclear antibody)    4. Fatigue, unspecified type    5. Snoring      This is a 53-year-old woman with history of HTN, HLD, GERD, anxiety, and cervical spondylosis, squamous cell CA on the nose s/p resection, positive YRIS 1:80 nuclear and speckled, and fibromyalgia. She has not been able to see sleep medicine or go for physical therapy.  She still reports chronic widespread pain and significant " fatigue.  She also reports new jolts at night in random parts of her body.  She clarifies that these are not electric jolts but rather sudden spasm-like sensations in different parts of her body.  I encouraged her to see sleep Medicine to evaluate her for her fatigue and abnormal movements when she is sleeping.  I also encouraged her to go for physical therapy.  May consider neurology referral for the abnormal movements in the future.    Plan:     Problem List Items Addressed This Visit    None  Visit Diagnoses       Fibromyalgia    -  Primary    Relevant Orders    Ambulatory referral/consult to Sleep Disorders    Ambulatory referral/consult to Physical/Occupational Therapy    Family history of systemic lupus erythematosus        Positive YRIS (antinuclear antibody)        Fatigue, unspecified type        Relevant Orders    Ambulatory referral/consult to Sleep Disorders    Snoring        Relevant Orders    Ambulatory referral/consult to Sleep Disorders          - Sleep Medicine referral  - Aquatic therapy  - Potential therapeutic options include Cymbalta, Savella, Elavil, gabapentin, Lyrica, naltrexone 4.5mg, and Tramadol  - Recommend good sleep hygiene, regular aerobic exercise (30 minutes per day 5 days per week), Apollo chi, yoga    Follow up in 6 months    30 minutes of total time spent on the encounter, which includes face to face time and non-face to face time preparing to see the patient (eg, review of tests), Obtaining and/or reviewing separately obtained history, Documenting clinical information in the electronic or other health record, Independently interpreting results (not separately reported) and communicating results to the patient/family/caregiver, or Care coordination (not separately reported).       Lizbeth Pina M.D.  Rheumatology Dept  [place], [place]

## 2023-10-16 ENCOUNTER — OFFICE VISIT (OUTPATIENT)
Dept: RHEUMATOLOGY | Facility: CLINIC | Age: 53
End: 2023-10-16
Payer: COMMERCIAL

## 2023-10-16 VITALS
WEIGHT: 180.75 LBS | HEIGHT: 63 IN | HEART RATE: 68 BPM | DIASTOLIC BLOOD PRESSURE: 89 MMHG | BODY MASS INDEX: 32.03 KG/M2 | SYSTOLIC BLOOD PRESSURE: 144 MMHG

## 2023-10-16 DIAGNOSIS — M79.7 FIBROMYALGIA: Primary | ICD-10-CM

## 2023-10-16 DIAGNOSIS — R06.83 SNORING: ICD-10-CM

## 2023-10-16 DIAGNOSIS — Z82.69 FAMILY HISTORY OF SYSTEMIC LUPUS ERYTHEMATOSUS: ICD-10-CM

## 2023-10-16 DIAGNOSIS — R53.83 FATIGUE, UNSPECIFIED TYPE: ICD-10-CM

## 2023-10-16 DIAGNOSIS — R76.8 POSITIVE ANA (ANTINUCLEAR ANTIBODY): ICD-10-CM

## 2023-10-16 PROCEDURE — 99999 PR PBB SHADOW E&M-EST. PATIENT-LVL V: ICD-10-PCS | Mod: PBBFAC,,, | Performed by: STUDENT IN AN ORGANIZED HEALTH CARE EDUCATION/TRAINING PROGRAM

## 2023-10-16 PROCEDURE — 3008F PR BODY MASS INDEX (BMI) DOCUMENTED: ICD-10-PCS | Mod: CPTII,S$GLB,, | Performed by: STUDENT IN AN ORGANIZED HEALTH CARE EDUCATION/TRAINING PROGRAM

## 2023-10-16 PROCEDURE — 99214 OFFICE O/P EST MOD 30 MIN: CPT | Mod: S$GLB,,, | Performed by: STUDENT IN AN ORGANIZED HEALTH CARE EDUCATION/TRAINING PROGRAM

## 2023-10-16 PROCEDURE — 4010F PR ACE/ARB THEARPY RXD/TAKEN: ICD-10-PCS | Mod: CPTII,S$GLB,, | Performed by: STUDENT IN AN ORGANIZED HEALTH CARE EDUCATION/TRAINING PROGRAM

## 2023-10-16 PROCEDURE — 99214 PR OFFICE/OUTPT VISIT, EST, LEVL IV, 30-39 MIN: ICD-10-PCS | Mod: S$GLB,,, | Performed by: STUDENT IN AN ORGANIZED HEALTH CARE EDUCATION/TRAINING PROGRAM

## 2023-10-16 PROCEDURE — 3079F PR MOST RECENT DIASTOLIC BLOOD PRESSURE 80-89 MM HG: ICD-10-PCS | Mod: CPTII,S$GLB,, | Performed by: STUDENT IN AN ORGANIZED HEALTH CARE EDUCATION/TRAINING PROGRAM

## 2023-10-16 PROCEDURE — 3077F SYST BP >= 140 MM HG: CPT | Mod: CPTII,S$GLB,, | Performed by: STUDENT IN AN ORGANIZED HEALTH CARE EDUCATION/TRAINING PROGRAM

## 2023-10-16 PROCEDURE — 1159F MED LIST DOCD IN RCRD: CPT | Mod: CPTII,S$GLB,, | Performed by: STUDENT IN AN ORGANIZED HEALTH CARE EDUCATION/TRAINING PROGRAM

## 2023-10-16 PROCEDURE — 3008F BODY MASS INDEX DOCD: CPT | Mod: CPTII,S$GLB,, | Performed by: STUDENT IN AN ORGANIZED HEALTH CARE EDUCATION/TRAINING PROGRAM

## 2023-10-16 PROCEDURE — 4010F ACE/ARB THERAPY RXD/TAKEN: CPT | Mod: CPTII,S$GLB,, | Performed by: STUDENT IN AN ORGANIZED HEALTH CARE EDUCATION/TRAINING PROGRAM

## 2023-10-16 PROCEDURE — 3077F PR MOST RECENT SYSTOLIC BLOOD PRESSURE >= 140 MM HG: ICD-10-PCS | Mod: CPTII,S$GLB,, | Performed by: STUDENT IN AN ORGANIZED HEALTH CARE EDUCATION/TRAINING PROGRAM

## 2023-10-16 PROCEDURE — 3079F DIAST BP 80-89 MM HG: CPT | Mod: CPTII,S$GLB,, | Performed by: STUDENT IN AN ORGANIZED HEALTH CARE EDUCATION/TRAINING PROGRAM

## 2023-10-16 PROCEDURE — 99999 PR PBB SHADOW E&M-EST. PATIENT-LVL V: CPT | Mod: PBBFAC,,, | Performed by: STUDENT IN AN ORGANIZED HEALTH CARE EDUCATION/TRAINING PROGRAM

## 2023-10-16 PROCEDURE — 1159F PR MEDICATION LIST DOCUMENTED IN MEDICAL RECORD: ICD-10-PCS | Mod: CPTII,S$GLB,, | Performed by: STUDENT IN AN ORGANIZED HEALTH CARE EDUCATION/TRAINING PROGRAM

## 2023-10-31 ENCOUNTER — TELEPHONE (OUTPATIENT)
Dept: FAMILY MEDICINE | Facility: CLINIC | Age: 53
End: 2023-10-31
Payer: COMMERCIAL

## 2023-10-31 ENCOUNTER — PATIENT MESSAGE (OUTPATIENT)
Dept: RHEUMATOLOGY | Facility: CLINIC | Age: 53
End: 2023-10-31
Payer: COMMERCIAL

## 2023-11-08 ENCOUNTER — CLINICAL SUPPORT (OUTPATIENT)
Dept: REHABILITATION | Facility: HOSPITAL | Age: 53
End: 2023-11-08
Attending: STUDENT IN AN ORGANIZED HEALTH CARE EDUCATION/TRAINING PROGRAM
Payer: COMMERCIAL

## 2023-11-08 DIAGNOSIS — M79.7 FIBROMYALGIA: ICD-10-CM

## 2023-11-08 PROCEDURE — 97162 PT EVAL MOD COMPLEX 30 MIN: CPT | Mod: PO | Performed by: PHYSICAL THERAPIST

## 2023-11-08 PROCEDURE — 97110 THERAPEUTIC EXERCISES: CPT | Mod: PO | Performed by: PHYSICAL THERAPIST

## 2023-11-08 NOTE — PATIENT INSTRUCTIONS
Forward Walk    Walk forward with one leg. Strike pool bottom with heel. Rolling over foot, bring other leg forward. You can also try backwards.  Session: Walk 3 minutes. Do 3 sessions per week.      Walk Backward    Walking backward, maintain proper posture. Keep step length equal and opposite arm and leg motion.  Session: Walk 3 minutes. Do 3 sessions per week.      Side Step    Step to the side then bring other leg to it. Reverse directions.  Session: Walk 3 minutes to each side. Do 3 sessions per week.      Standing: Unilateral    Stand, one heel on lowest step, leg straight, standing leg slightly bent. Slowly lean forward, keeping back straight. Hold 30 seconds.  Repeat 3 times each leg per session. Do 3 sessions per week.        Quads / HF, Standing    Stand, holding on tot he pool wall. Flex the involved knee by placing on the second step. Lean back until you feel a stretch in the front of the thigh. Maintain hip extension. Hold 30 seconds.  Repeat 3 times each leg per session. Do 3 sessions per week.      Calf Stretch    Stand facing the pool wall, holding onto the edge of the pool with elbows straight and legs together. Take one step forward with the uninvolved leg, touching the toes against the pool wall, while bending the elbows as the trunk moves forward. Keep the heel of the back foot on the floor. Push the hips forward and the back heel down. Hold 30 seconds.  Repeat 3 times each leg per session. Do 3 sessions per week.      Hip Flexor Stretch    Place hand on wall for support. Place one leg in front of other, keeping the back knee straight and the front knee bent. Press the hip of the back leg forward while keeping the trunk upright, feeling the stretching in the front of the hip. Hold 30 seconds.   Repeat 3 times each leg per session. Do 3 sessions per week.      Calf Raise: Bilateral (Standing)    In the water, stand on two feet and rise on ball of foot. Do not let ankle roll out. Slowly return to  start and repeat.  Repeat 20 times each leg per session. Do 3 sessions per week.      Standing Glute Squeeze    Isometrically contract the buttocks, squeezing them together tightly. Hold for a count of 5, then relax.  Repeat 20 times each leg per session. Do 3 sessions per week.      Hip Extension, Knee Bent    Stand with the stomach against the pool wall. Holding onto the edge, slowly flex the knee and extend the leg backward, maintaining neutral hip alignment.  Repeat 20 times each leg per session. Do 3 sessions per week.      Hamstring Pull-Back    Stand with the back against the pool wall, hold onto the wall, and flex the hip to 90 degrees. Flex and extend the knee while maintaining hip flexion.  Repeat 20 times each leg per session. Do 3 sessions per week.      Hip Flexion, Knee Straight    Stand with feet together with one side toward the pool wall, holding on with the near arm. Gently raise the opposite leg straight out in front of the body, flexing only from the hip. Keep trunk upright and the head facing forward. Knee of the support leg should be slightly bent. Return to starting position.  Repeat 20 times each leg per session. Do 3 sessions per week.      Hip Extension, Knee Straight    Stand with feet together with one side toward the pool wall, holding on with the near arm. Gently raise the opposite leg straight backward, extending only from the hip, tightening the buttock. Keep trunk upright and the head facing forward. Knee of the support leg should be slightly bent. Return to starting position.  Repeat 20 times each LE per session. Do 3 sessions per week.      Hip Lateral Abduction / Adduction    Stand holding onto the pool wall, an arm's length away. Gently raise leg out to side. Keep knee straight. Return to start.  Repeat sequence 20 times each leg per session. Do 3 sessions per week.      Knee Flexion / Extension    Stand facing the pool wall with the fronts of the thighs and hips touching the  wall. Keep the trunk upright with the knees and hips in vertical alignment. Gently flex the knee as far as possible, pressing the heel toward the buttocks.  Repeat sequence 20 times each leg per session. Do 3 sessions per week.      Seated Leg Extension    Assume vertical position. Flex the hip to 90 degrees (seated position). Keeping the thighs together, alternately flex and extend the knees.  Repeat 20 times each leg per set. Do 1 sets per session. Do 3 sessions per week.      Lunge Forward    With front foot on low step, lunge forward, bending both knees. Return by straightening knee and pushing back with forward foot.  Repeat 20 times each side per session. Do 3 sessions per week.      Hip Circumduction     Stand holding onto the pool wall, an arm's length away. Gently raise one leg to the side. Move the leg in a clockwise circular pattern from the hip, keeping the leg straight. Keep the trunk upright and the head forward. Repeat in a counterclockwise direction.  Repeat 20 times each side per session. Do 3 sessions per week.      Front to Back Weight Shifting    Stand with one foot in front of the other. Shift body weight from the front leg to the back leg. Shift the body weight from the back leg to the front leg. Repeat with other foot forward. Gradually progress to a normal step length.  Repeat 20 times each leg per set. Do 1 sets per session. Do 3 sessions per week.      Lateral Lunge    Hands on hips or in front of you, back straight, step to side and bend your knee. Return to start and lunge to the other side.  Do 20 times alternating legs per set. Do 1 sets per session. Do 3 set per week.      Sit Cycle    Assume vertical position. Flex the hips to 90 degrees (seated position). Performa a cycling action with the legs, keeping the trunk vertical and the legs in front of the body. Use Noodle or sit on steps.  Repeat 20 times each leg per set. Do 1 sets per session. Do 3 sessions per  week.      Marching    Lift right leg toward chest with knee bent. Return to start and alternate legs.  Repeat 20 times each leg per set. Do 1 sets per session. Do 3 sessions per week.      Resistive Shoulder Flexion    Hold resistive devices in the hands at the sides, palms forward. Raise the involved arm in front of the body and lift it towards the surface of the water, keeping the elbow straight but not locked. Return to the original position.  Repeat 20 times each leg per set. Do 1 sets per session. Do 3 sessions per week.      Resistive Shoulder Extension    Hold resistive device in the hands at the sides, palms back. Lift the involved arm posteriorly toward the surface of the water, keeping the elbow straight but not locked. Return to original position.  Repeat 20 times each leg per set. Do 1 sets per session. Do 3 sessions per week.      Resistive  Horizontal Abduction and Adduction    Hold resistive paddles in front of the body at shoulder level, with the elbows slightly flexed. With palms facing posteriorly, move the hands away from the midline of the body (abduction). Turn the palms forward and adduct the arms to return to the original position.  Repeat 20 times each leg per set. Do 1 sets per session. Do 3 sessions per week.      Rowing    Secure stretch bands to the ladder or any other fixed object in the pool and face the ladder, holding the handles in both hands. Place one foot on the ladder for support. Gently pull the hands back at the chest level, leading with the elbows and adducting the scapulae. Return to original position.  Repeat 20 times each leg per set. Do 1 sets per session. Do 3 sessions per week.      Upright Rows    Stand on one end of the stretch band and hold the handle with both hands. Leading with the elbow, pull the hands toward the surface of the water, keeping the hands close to the body. Return to the original position.  Repeat 20 times each leg per set. Do 1 sets per session. Do  3 sessions per week.      Combined Elbow Bend    Stand with the hands at the side and the palms facing forward. Flex the elbows until the hands touch the shoulder. Turn the hands until the palms are facing down. Extend the elbows, keeping them close to the body.  Repeat 20 times each leg per set. Do 1 sets per session. Do 3 sessions per week.      Resistive Shoulder Abduction and Adduction    Hold resistive devices out to the sides slightly below shoulder level. Pull the arms toward the hips (adduction) maintaining elbow extension throughout the exercise. Do not twist or rotate the trunk. Return to the original position (abduction).  Repeat 20 times each leg per set. Do 1 sets per session. Do 3 sessions per week.      Lateral Pull-Downs    Using flotation bells, stand with feet shoulder-width apart, the knees slightly bent, and the arms in front of the body. Maintain elbow flexion throughout the exercise, and do not twist or rotate the trunk. Slowly pull the arms down toward the sides. Return to the starting position.  Repeat 20 times each leg per set. Do 1 sets per session. Do 3 sessions per week.      Standing Crunches    Stand upright with the pelvis tilted back. Hold a ball or flotation device snugly against the chest. Gently contract the abdominal muscles to flex the spine. Hold the contraction for 4 counts, then relax for 4 counts. Maintain pelvic tilt throughout.  Repeat 20 times each leg per set. Do 1 sets per session. Do 3 sessions per week.      Triceps Extension    Stand upright with the elbow on the involved side flexed, holding a flotation device in the hand with the palm down. Hold onto the edge of the pool with the opposite hand. Keeping the elbow close to the side of the body, extend the forearm at the elbow. Return to the original position.  Repeat 20 times each leg per set. Do 1 sets per session. Do 3 sessions per week.      Resistive Band Crunches    Secure a stretch band to the pool ladder or any  other stable attachment. Stand with the feet shoulder-width apart facing the ladder, and hold the stretch band with stable (unmoving) flexed arms. Slowly contract the abdominals to flex the spine, pulling on the band. Hold 3 seconds, then relax.  Repeat 20 times each leg per set. Do 1 sets per session. Do 3 sessions per week.      Push Offs    Stand with the feet shoulder-width apart facing the pool wall. Hold onto the edge of the pool wall with both hands at arm's length. Keeping the body straight (no arch in the back), lean forward bending the arms until the chest touches or is close to the wall. Do not move the feet and keep heels on the warren. Push off with both hands to the starting position. All movements should flow smoothly and continuously.  Repeat 20 times each leg per set. Do 1 sets per session. Do 3 sessions per week.      Cross Shoulder Stretch    Reach across the chest with the involved arm. Place other hand above the elbow and gently pull the arm across the chest. Hold 30 seconds, the relax. Repeat on the other side.  Repeat 3 times each leg per set. Do 1 sets per session. Do 3 sessions per week.      Thigh Side Bends     Stand with the feet shoulder-width apart and the hands at the sides. Slowly slide the palm of the hand on the uninvolved side down the side of the leg to the knee, laterally flexing the spine. Return the original position.  Repeat 20 times each leg per set. Do 1 sets per session. Do 3 sessions per week.      Shoulder Extension, Elbows Bent    Place the back against a parallel bar. Hold onto the bar with both hands, palms back. Keep the feet shoulder-width apart. Gently lower the body by bending the knees until a stretch is felt in the shoulders. Hold 3 seconds then relax.  Repeat 20 times each leg per set. Do 1 sets per session. Do 3 sessions per week.      Side Arm Pull    Secure a stretch band to the ladder or any other fixed object in the pool, and stand with the uninvolved side of  the body towards the ladder. Place the uninvolved hand on the ladder for support. Reach across the body with the other hand, grasp the handles of the stretch band, and gently abduct the arm, keeping the elbow straight. Return to the original start position.  Repeat 20 times each leg per set. Do 1 sets per session. Do 3 sessions per week.      Wand Shoulder Stretch     Hold the wand with both hands behind the back and shoulder-width apart. Lift the arms upward until a stretch is felt in the involved shoulder. Hold 3 seconds, then lower arms and relax.  Repeat 20 times each leg per set. Do 1 sets per session. Do 3 sessions per week.    From [facility].
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
## 2023-12-27 RX ORDER — HYDROCODONE BITARTRATE AND ACETAMINOPHEN 5; 325 MG/1; MG/1
1 TABLET ORAL EVERY 12 HOURS PRN
Qty: 30 TABLET | Refills: 0 | Status: SHIPPED | OUTPATIENT
Start: 2023-12-30 | End: 2024-01-29

## 2024-03-01 DIAGNOSIS — U07.1 COVID-19 VIRUS DETECTED: ICD-10-CM

## 2024-03-01 PROBLEM — K52.9 COLITIS: Status: ACTIVE | Noted: 2024-03-01

## 2024-03-01 PROBLEM — K21.00 GASTRO-ESOPHAGEAL REFLUX DISEASE WITH ESOPHAGITIS: Status: ACTIVE | Noted: 2024-03-01

## 2024-03-01 PROBLEM — K21.9 GASTRO-ESOPHAGEAL REFLUX DISEASE WITHOUT ESOPHAGITIS: Status: ACTIVE | Noted: 2024-03-01

## 2024-03-01 PROBLEM — J06.9 UPPER RESPIRATORY TRACT INFECTION: Status: ACTIVE | Noted: 2024-03-01

## 2024-03-01 PROBLEM — G47.30 SLEEP APNEA: Status: ACTIVE | Noted: 2024-03-01

## 2024-03-01 PROBLEM — R14.0 ABDOMINAL DISTENSION (GASEOUS): Status: ACTIVE | Noted: 2024-03-01

## 2024-03-01 PROBLEM — K59.04 CHRONIC IDIOPATHIC CONSTIPATION: Status: ACTIVE | Noted: 2024-03-01

## 2024-03-01 PROBLEM — R11.0 NAUSEA: Status: ACTIVE | Noted: 2024-03-01

## 2024-03-01 PROBLEM — F41.9 ANXIETY DISORDER: Status: ACTIVE | Noted: 2024-03-01

## 2024-03-01 PROBLEM — R10.13 EPIGASTRIC PAIN: Status: ACTIVE | Noted: 2024-03-01

## 2024-03-01 PROBLEM — R10.84 GENERALIZED ABDOMINAL PAIN: Status: ACTIVE | Noted: 2024-03-01

## 2024-03-04 ENCOUNTER — PATIENT MESSAGE (OUTPATIENT)
Dept: RESEARCH | Facility: HOSPITAL | Age: 54
End: 2024-03-04
Payer: COMMERCIAL

## 2024-04-10 PROBLEM — K76.0 HEPATIC STEATOSIS: Status: ACTIVE | Noted: 2024-04-10

## 2024-07-19 PROBLEM — R10.13 DYSPEPSIA: Status: ACTIVE | Noted: 2024-07-19

## 2024-09-05 PROBLEM — R07.9 CHEST PAIN: Status: ACTIVE | Noted: 2024-09-05

## 2024-09-05 PROBLEM — R51.9 HEADACHE: Status: ACTIVE | Noted: 2024-09-05

## 2024-11-18 ENCOUNTER — TELEPHONE (OUTPATIENT)
Dept: PAIN MEDICINE | Facility: CLINIC | Age: 54
End: 2024-11-18
Payer: COMMERCIAL

## 2024-11-18 ENCOUNTER — OFFICE VISIT (OUTPATIENT)
Dept: PAIN MEDICINE | Facility: CLINIC | Age: 54
End: 2024-11-18
Payer: COMMERCIAL

## 2024-11-18 VITALS
BODY MASS INDEX: 34.96 KG/M2 | HEIGHT: 63 IN | HEART RATE: 77 BPM | WEIGHT: 197.31 LBS | DIASTOLIC BLOOD PRESSURE: 83 MMHG | SYSTOLIC BLOOD PRESSURE: 151 MMHG

## 2024-11-18 DIAGNOSIS — M47.816 LUMBAR SPONDYLOSIS: Primary | ICD-10-CM

## 2024-11-18 DIAGNOSIS — M48.02 FORAMINAL STENOSIS OF CERVICAL REGION: ICD-10-CM

## 2024-11-18 DIAGNOSIS — M47.812 CERVICAL SPONDYLOSIS: ICD-10-CM

## 2024-11-18 PROCEDURE — 3008F BODY MASS INDEX DOCD: CPT | Mod: CPTII,S$GLB,, | Performed by: ANESTHESIOLOGY

## 2024-11-18 PROCEDURE — 1159F MED LIST DOCD IN RCRD: CPT | Mod: CPTII,S$GLB,, | Performed by: ANESTHESIOLOGY

## 2024-11-18 PROCEDURE — 3077F SYST BP >= 140 MM HG: CPT | Mod: CPTII,S$GLB,, | Performed by: ANESTHESIOLOGY

## 2024-11-18 PROCEDURE — 3061F NEG MICROALBUMINURIA REV: CPT | Mod: CPTII,S$GLB,, | Performed by: ANESTHESIOLOGY

## 2024-11-18 PROCEDURE — 3044F HG A1C LEVEL LT 7.0%: CPT | Mod: CPTII,S$GLB,, | Performed by: ANESTHESIOLOGY

## 2024-11-18 PROCEDURE — 99214 OFFICE O/P EST MOD 30 MIN: CPT | Mod: S$GLB,,, | Performed by: ANESTHESIOLOGY

## 2024-11-18 PROCEDURE — 3066F NEPHROPATHY DOC TX: CPT | Mod: CPTII,S$GLB,, | Performed by: ANESTHESIOLOGY

## 2024-11-18 PROCEDURE — 4010F ACE/ARB THERAPY RXD/TAKEN: CPT | Mod: CPTII,S$GLB,, | Performed by: ANESTHESIOLOGY

## 2024-11-18 PROCEDURE — 3079F DIAST BP 80-89 MM HG: CPT | Mod: CPTII,S$GLB,, | Performed by: ANESTHESIOLOGY

## 2024-11-18 PROCEDURE — 99999 PR PBB SHADOW E&M-EST. PATIENT-LVL IV: CPT | Mod: PBBFAC,,, | Performed by: ANESTHESIOLOGY

## 2024-11-18 RX ORDER — HYDROCODONE BITARTRATE AND ACETAMINOPHEN 5; 325 MG/1; MG/1
1 TABLET ORAL EVERY 8 HOURS PRN
Qty: 20 TABLET | Refills: 0 | Status: SHIPPED | OUTPATIENT
Start: 2024-11-18

## 2024-11-18 NOTE — TELEPHONE ENCOUNTER
Physician - Dr Doshi    Type of Procedure/Injection - Cervical Radiofrequency Ablation  C5, C6, and C7           Laterality - Bilateral      Priority - Normal      Anxiolysis- RNIV      Need to hold medication - Yes      Aspirin for 7 days          Clearance needed - Yes      Follow up - 4 week

## 2024-11-18 NOTE — PROGRESS NOTES
This note was completed with dictation software and grammatical errors may exist.    Chief Complaint   Patient presents with    Low-back Pain    Neck Pain        HPI: Lupe Hein is a 54 y.o. year old female patient who has a past medical history of Abdominal bruit, Abnormal EKG, Anxiety, Coronary atherosclerosis, Environmental and seasonal allergies, Emely Barr virus infection, Family history of cardiovascular disease, Fibroids, Fibromyalgia, GERD (gastroesophageal reflux disease), Heart murmur, History of chicken pox, Hyperlipidemia, Hypertension, Migraine, Overactive bladder, PVC (premature ventricular contraction), and Sleep apnea. She presents in referral from Dr. Bob Fabian for neck pain. Patient reports that she had been doing well after the ablation we had done several years ago, the pain began returning in the last several months.  Is not as bad as it was previously but she feels it in her bilateral neck worse with turning her head side to side.  She denies any pain radiating into her arms, no numbness or weakness.  She also complains of low back pain, worse with bending and twisting, denies any radiation in the legs, no numbness in her feet or legs,.  She does get some relief with lying down.          Previous history:  Sates that she has had pain for years, probably greater than 3 years.  Is located the midline neck, does not radiate out into the arms but does report some numbness and tingling in her left thumb and 5th finger at times.  Otherwise she denies any radiation down the arms.  She does have some numbness and tingling in both hands when she sleeps at night.  Otherwise her pain is worse when she 1st wakes up in the morning, feels very stiff, improved slightly throughout the day but then worsens if she is been sitting for a long time.  She does feel that her balance has been off at times but denies any gentry weakness.  She would gone through physical therapy in the past without  much relief.  She has tried NSAIDs but has developed an allergy to them.  She has taken hydrocodone at times.  She had an MRI performed in May, 2022 that shows significant degenerative changes at C5/6 and C6/7.  She has a history of neck pain, had seen Dr. Lane Oliveira, spine surgeon who recommended bilateral C5/6 and C6/7 radiofrequency ablation.  She also reports pain all over body, she feels that she has pain in her back, all of her joints when she wakes up in the morning.    Pain intervention history:  She is status post bilateral C5/6 and C6/7 medial branch radiofrequency ablation on 2022 with 95% relief.     Spine surgeries:  None    Antineuropathics:  NSAIDs:  Physical therapy:  Antidepressants:  Zoloft 25  Muscle relaxers:  Opioids:  Hydrocodone 10/325  Antiplatelets/Anticoagulants:  None    ROS:  She reports neck pain, joint stiffness, joint pain.  Balance of review of systems is negative.    Lab Results   Component Value Date    HGBA1C 5.1 2024       Lab Results   Component Value Date    WBC 6.41 2024    HGB 13.9 2024    HCT 42.1 2024    MCV 89 2024     2024             Past Medical History:   Diagnosis Date    Abdominal bruit     Abnormal EKG     Anxiety     Coronary atherosclerosis 2016    Environmental and seasonal allergies     Emely Barr virus infection     Family history of cardiovascular disease     Fibroids     uterine    Fibromyalgia     GERD (gastroesophageal reflux disease)     Heart murmur     History of chicken pox     Hyperlipidemia     Hypertension     Migraine     Overactive bladder     PVC (premature ventricular contraction)     Sleep apnea     doesn not wear CPAP       Past Surgical History:   Procedure Laterality Date    AUGMENTATION OF BREAST  2008    BREAST BIOPSY Left 2015    benign needle biopsy of lymph node. pt still feels this node 2020    BREAST SURGERY  2009     SECTION      X 2    COLONOSCOPY N/A 2022     Procedure: COLONOSCOPY;  Surgeon: Jorge Luis Brown MD;  Location: [facility];  Service: Endoscopy;  Laterality: N/A;    ESOPHAGOGASTRODUODENOSCOPY N/A 11/09/2022    Procedure: EGD (ESOPHAGOGASTRODUODENOSCOPY);  Surgeon: Jorge Luis Brown MD;  Location: [facility] ENDO;  Service: Endoscopy;  Laterality: N/A;    HYSTERECTOMY      partial    INJECTION OF ANESTHETIC AGENT AROUND MEDIAL BRANCH NERVES INNERVATING CERVICAL FACET JOINT Bilateral 10/03/2022    Procedure: Block-nerve-medial branch-cervical C5/6, C6/7;  Surgeon: Julius Doshi MD;  Location: [facility] OR;  Service: Pain Management;  Laterality: Bilateral;    INJECTION OF ANESTHETIC AGENT AROUND MEDIAL BRANCH NERVES INNERVATING CERVICAL FACET JOINT Bilateral 10/13/2022    Procedure: Block-nerve-medial branch-cervical, C5/6, C6/7;  Surgeon: Julius Doshi MD;  Location: [facility] OR;  Service: Pain Management;  Laterality: Bilateral;    LASIK      LYMPH NODE BIOPSY      Dr. Byrne     RADIOFREQUENCY ABLATION Bilateral 11/18/2022    Procedure: Radiofrequency Ablation, Cervical, C5/6, C6/7,, ZAHIDA.;  Surgeon: Julius Doshi MD;  Location: [facility] OR;  Service: Pain Management;  Laterality: Bilateral;    RHINOPLASTY TIP         Social History     Socioeconomic History    Marital status:      Spouse name: ce    Number of children: 2   Tobacco Use    Smoking status: Never     Passive exposure: Never    Smokeless tobacco: Never   Substance and Sexual Activity    Alcohol use: Yes     Comment: occasionally, increased since COVID    Drug use: Never    Sexual activity: Yes     Partners: Male     Social Drivers of Health     Food Insecurity: No Food Insecurity (9/6/2024)    Hunger Vital Sign     Worried About Running Out of Food in the Last Year: Never true     Ran Out of Food in the Last Year: Never true   Transportation Needs: No Transportation Needs (9/6/2024)    TRANSPORTATION NEEDS     Transportation : No   Stress: Stress Concern Present (10/2/2020)     "[employer] - Occupational Stress Questionnaire     Feeling of Stress : Very much   Housing Stability: Low Risk  (2024)    Housing Stability Vital Sign     Unable to Pay for Housing in the Last Year: No     Homeless in the Last Year: No         Medications/Allergies: See med card    Vitals:    24 0815   BP: (!) 151/83   Pulse: 77   Weight: 89.5 kg (197 lb 5 oz)   Height: 5' 3" (1.6 m)   PainSc:   4   PainLoc: Back     Body mass index is 34.95 kg/m².    Physical exam:  Gen: A and O x3, pleasant, well-groomed  Musculoskeletal: No antalgic gait.   Coordination   Romberg:  Slight imbalance  Tandem walking coordination: normal    Neuro:  Motor:    Right Left   C4 Shoulder Abduction  5  5   C5 Elbow Flexion    5  5   C6 Wrist Extension  5  5   C7 Elbow Extension   5  5   C8/T1 Hand Intrinsics   5  5   C8 First Dorsal Interosseus  5  5   C8 Abductor Pollicus Brevis  5  5      Left  Right    Triceps DTR 2+ 2+   Biceps DTR 2+ 2+   Brachioradialis DTR 2+ 2+   Patellar DTR 2+ 3+   Achilles DTR 2+ 2+   Ghotra Absent  Absent               Sensory: Intact and symmetrical to light touch and pinprick in C2-T1 dermatomes bilaterally.  Cervical spine: ROM is full in flexion, extension and lateral rotation with increased pain on flexion and also extension and especially oblique extension to the left greater than right side causing pain   Spurling's maneuver causes no neck pain to either side.  Myofascial exam: No Tenderness to palpation across cervical paraspinous region bilaterally.    Lumbar exam:  Range of motion is mildly reduced with both flexion and extension with increased pain on extension and lateral rotation.    Straight leg raise negative bilaterally   Everardo's test negative bilaterally.  No tenderness palpation over the lumbar paraspinous muscles or trochanters.    Imagin22 MRI C-spine:  The cervical vertebral body heights appear to be preserved.  There appears to be minimal " 1 mm retrolisthesis of C5 on C6 and C6 on C7.  There is reversal the normal cervical lordosis which could be related to muscular spasm and/or positioning.  Modic 1 endplate degenerative signal changes are seen at the opposing endplates of C6-C7 with likely reactive edema extending into the left greater than right pedicles.  Similar milder findings are noted on the previous MRI from 2019. There is patchy increased T2 signal within the inferior posterior right mastoid air cells compatible with small right mastoid effusion.   The cervical cord demonstrates no definite abnormal T2 signal suggestive of definite myelomalacia or cord edema.   C2-C3 demonstrates no significant posterior disc protrusion, central spinal canal stenosis, or neural foraminal stenosis.   C3-C4 demonstrates mild broad-based posterior disc osteophyte complex and small central annular fissure.  There is effacement of the ventral thecal sac with mild overall central spinal canal stenosis.  The AP diameter of the central thecal sac measures approximately 8 mm.  No significant neural foraminal stenosis is seen..   C4-C5 demonstrates mild broad-based posterior disc osteophyte complex with effacement of the ventral thecal sac and mild overall central spinal canal stenosis.  Moderate left uncovertebral joint hypertrophy is seen.  Moderate left neural foraminal narrowing is similar to the previous study.   C5-C6 demonstrates moderate to severe disc space narrowing, mild broad-based posterior disc osteophyte complex, moderate bilateral uncovertebral joint hypertrophy, and mild bilateral facet arthrosis with mild overall central spinal canal stenosis.  Moderate bilateral neural foraminal narrowing is again seen.   C6-C7 demonstrates moderate disc space narrowing, moderate to severe broad-based posterior disc osteophyte complex, mild-to-moderate bilateral uncovertebral joint hypertrophy.  There is effacement of the ventral thecal sac with mild to moderate  overall central spinal canal stenosis with the AP diameter of the central thecal sac measuring approximately 7-8 mm.  Mild-to-moderate right and mild left neural foraminal narrowing is noted..   C7-T1 demonstrates no significant posterior disc protrusion, central spinal canal stenosis, or neural foraminal stenosis.    5/7/22 MRI L-spine:  The lumbar vertebral body heights are preserved.  The static anterior-posterior cervical vertebral body alignment is within normal limits. No suspicious bone marrow edema suggestive of acute fracture is visualized.  The visualized abdominal aorta is nonaneurysmal.  There is a subcentimeter T2 hyperintense focus at the lower pole right kidney which may be too small to adequately characterize and incompletely assessed on the current study, statistically likely reflective of a cyst..  The conus medullaris terminate at approximately the L1 level.   L1-L2 demonstrates no significant posterior disc protrusion, central spinal canal stenosis, or neural foraminal stenosis.   L2-L3 demonstrates mild disc desiccation, mild broad-based disc bulge, and mild bilateral facet arthrosis without significant central spinal canal stenosis.  Minimal anterior-inferior bilateral neural foraminal narrowing is noted..   L3-L4 demonstrates disc desiccation, mild broad-based disc bulge, and mild bilateral facet arthrosis without significant central spinal canal stenosis.  Mild left neural foraminal narrowing is noted.   L4-L5 demonstrates disc desiccation, mild-to-moderate broad-based disc bulge asymmetric to the right, ligamentum flavum hypertrophy, and mild-to-moderate bilateral facet arthrosis.  No significant central spinal canal stenosis is seen.  Moderate bilateral neural foraminal narrowing is noted.   L5-S1 demonstrates disc desiccation, mild-to-moderate broad-based disc bulge slightly asymmetric to the right, ligamentum flavum hypertrophy, and mild-to-moderate bilateral facet arthrosis.  No  significant central spinal canal stenosis is seen.  Mild-to-moderate bilateral neural foraminal narrowing is noted.       Assessment:  Lupe Hein is a 54 y.o. year old female patient who has a past medical history of Abdominal bruit, Abnormal EKG, Anxiety, Coronary atherosclerosis, Environmental and seasonal allergies, Family history of cardiovascular disease, Fibroids, GERD (gastroesophageal reflux disease), Heart murmur, History of chicken pox, Hyperlipidemia, Hypertension, Migraine, Overactive bladder, and PVC (premature ventricular contraction). She presents in referral from Dr. Tee Carrington for neck pain.    1. Lumbar spondylosis  Ambulatory referral/consult to Physical/Occupational Therapy      2. Cervical spondylosis  Ambulatory referral/consult to Pain Clinic      3. Foraminal stenosis of cervical region  Ambulatory referral/consult to Pain Clinic          Plan:  1.  We reviewed her symptoms, exam and MRIs.  We discussed that she had good relief with the radiofrequency ablation in the cervical region and we can repeat this since she had over 1 year of relief.  I have scheduled her for bilateral C5/6 and C6/7 RFA and I will have her follow up in several weeks after the procedure.    2.  We discussed her low back pain is likely facet mediated as well and I will get her started with physical therapy to work on core strengthening.  We discussed that if her back pain is not improved we would consider medial branch blocks as well.

## 2024-11-26 ENCOUNTER — TELEPHONE (OUTPATIENT)
Dept: PAIN MEDICINE | Facility: CLINIC | Age: 54
End: 2024-11-26
Payer: COMMERCIAL

## 2024-12-09 ENCOUNTER — TELEPHONE (OUTPATIENT)
Dept: PAIN MEDICINE | Facility: CLINIC | Age: 54
End: 2024-12-09
Payer: COMMERCIAL

## 2025-05-05 DIAGNOSIS — M47.812 CERVICAL SPONDYLOSIS: ICD-10-CM

## 2025-05-06 RX ORDER — HYDROCODONE BITARTRATE AND ACETAMINOPHEN 5; 325 MG/1; MG/1
1 TABLET ORAL EVERY 8 HOURS PRN
Qty: 20 TABLET | Refills: 0 | Status: SHIPPED | OUTPATIENT
Start: 2025-05-06

## 2025-06-25 DIAGNOSIS — M47.812 CERVICAL SPONDYLOSIS: ICD-10-CM

## 2025-06-25 RX ORDER — HYDROCODONE BITARTRATE AND ACETAMINOPHEN 5; 325 MG/1; MG/1
1 TABLET ORAL EVERY 8 HOURS PRN
Qty: 20 TABLET | Refills: 0 | Status: CANCELLED | OUTPATIENT
Start: 2025-06-25

## 2025-07-02 DIAGNOSIS — M47.812 CERVICAL SPONDYLOSIS: ICD-10-CM

## 2025-07-02 RX ORDER — HYDROCODONE BITARTRATE AND ACETAMINOPHEN 5; 325 MG/1; MG/1
1 TABLET ORAL EVERY 8 HOURS PRN
Qty: 20 TABLET | Refills: 0 | Status: SHIPPED | OUTPATIENT
Start: 2025-07-02

## 2025-07-02 NOTE — TELEPHONE ENCOUNTER
Dr Kinsey, please send Rx. I have reviewed the [place] [employer] website and there are no abberancies.        Staff, please ensure this patient has a follow up prior to any future refills.

## (undated) DEVICE — GLOVE SURGICAL LATEX SZ 7

## (undated) DEVICE — NDL HYPO REG 25G X 1 1/2

## (undated) DEVICE — TOWEL OR DISP STRL BLUE 4/PK

## (undated) DEVICE — CANNULA CVD 100MM X 20G

## (undated) DEVICE — PAD ELECTROSURGICAL PAT PLATE

## (undated) DEVICE — NDL 18GA X1 1/2 REG BEVEL

## (undated) DEVICE — APPLICATOR CHLORAPREP CLR 10.5

## (undated) DEVICE — TRAY NERVE BLOCK

## (undated) DEVICE — MARKER SKIN STND TIP BLUE BARR